# Patient Record
Sex: FEMALE | Race: WHITE | NOT HISPANIC OR LATINO | Employment: OTHER | URBAN - METROPOLITAN AREA
[De-identification: names, ages, dates, MRNs, and addresses within clinical notes are randomized per-mention and may not be internally consistent; named-entity substitution may affect disease eponyms.]

---

## 2017-04-28 ENCOUNTER — HOSPITAL ENCOUNTER (OUTPATIENT)
Dept: MAMMOGRAPHY | Facility: CLINIC | Age: 47
Discharge: HOME | End: 2017-04-28
Attending: INTERNAL MEDICINE | Admitting: INTERNAL MEDICINE
Payer: COMMERCIAL

## 2017-04-28 DIAGNOSIS — Z12.31 ENCOUNTER FOR SCREENING MAMMOGRAM FOR HIGH-RISK PATIENT: ICD-10-CM

## 2017-04-28 PROCEDURE — 77063 BREAST TOMOSYNTHESIS BI: CPT

## 2017-04-28 PROCEDURE — G0202 SCR MAMMO BI INCL CAD: HCPCS

## 2017-11-06 ENCOUNTER — HOSPITAL ENCOUNTER (OUTPATIENT)
Dept: MRI IMAGING | Facility: CLINIC | Age: 47
Discharge: HOME | End: 2017-11-06
Attending: INTERNAL MEDICINE | Admitting: INTERNAL MEDICINE
Payer: COMMERCIAL

## 2017-11-06 DIAGNOSIS — Z91.89 AT RISK FOR BREAST CANCER: ICD-10-CM

## 2017-11-06 DIAGNOSIS — R92.30 DENSE BREAST: ICD-10-CM

## 2017-11-06 PROCEDURE — 25000128 H RX IP 250 OP 636: Performed by: INTERNAL MEDICINE

## 2017-11-06 PROCEDURE — A9585 GADOBUTROL INJECTION: HCPCS | Performed by: INTERNAL MEDICINE

## 2017-11-06 PROCEDURE — 0159T MR BREAST BILATERAL W/O & W CONTRAST: CPT

## 2017-11-06 RX ORDER — GADOBUTROL 604.72 MG/ML
8 INJECTION INTRAVENOUS ONCE
Status: COMPLETED | OUTPATIENT
Start: 2017-11-06 | End: 2017-11-06

## 2017-11-06 RX ADMIN — GADOBUTROL 8 ML: 604.72 INJECTION INTRAVENOUS at 11:53

## 2017-11-07 ENCOUNTER — TELEPHONE (OUTPATIENT)
Dept: MAMMOGRAPHY | Facility: CLINIC | Age: 47
End: 2017-11-07

## 2017-11-07 NOTE — TELEPHONE ENCOUNTER
Ms. Evans was called and given her 11/6/2017 Breast MRI Results (Benign Findings). She will continue with her usual High risk Breast imaging routine per Dr. Torrez.

## 2018-05-01 ENCOUNTER — HOSPITAL ENCOUNTER (OUTPATIENT)
Dept: MAMMOGRAPHY | Facility: CLINIC | Age: 48
Discharge: HOME | End: 2018-05-01
Attending: INTERNAL MEDICINE | Admitting: INTERNAL MEDICINE
Payer: COMMERCIAL

## 2018-05-01 DIAGNOSIS — Z12.31 VISIT FOR SCREENING MAMMOGRAM: ICD-10-CM

## 2018-05-01 PROCEDURE — 77063 BREAST TOMOSYNTHESIS BI: CPT

## 2018-07-17 ENCOUNTER — TRANSFERRED RECORDS (OUTPATIENT)
Dept: HEALTH INFORMATION MANAGEMENT | Facility: CLINIC | Age: 48
End: 2018-07-17

## 2018-11-05 ENCOUNTER — HOSPITAL ENCOUNTER (OUTPATIENT)
Dept: MRI IMAGING | Facility: CLINIC | Age: 48
Discharge: HOME | End: 2018-11-05
Attending: INTERNAL MEDICINE | Admitting: INTERNAL MEDICINE
Payer: COMMERCIAL

## 2018-11-05 DIAGNOSIS — R92.30 DENSE BREAST: ICD-10-CM

## 2018-11-05 DIAGNOSIS — Z91.89 AT HIGH RISK FOR BREAST CANCER: ICD-10-CM

## 2018-11-05 PROCEDURE — 77059 MR BREAST BILATERAL W/O & W CONTRAST: CPT

## 2018-11-05 PROCEDURE — 25500064 ZZH RX 255 OP 636: Performed by: INTERNAL MEDICINE

## 2018-11-05 PROCEDURE — A9585 GADOBUTROL INJECTION: HCPCS | Performed by: INTERNAL MEDICINE

## 2018-11-05 RX ORDER — GADOBUTROL 604.72 MG/ML
8 INJECTION INTRAVENOUS ONCE
Status: COMPLETED | OUTPATIENT
Start: 2018-11-05 | End: 2018-11-05

## 2018-11-05 RX ADMIN — GADOBUTROL 8 ML: 604.72 INJECTION INTRAVENOUS at 10:58

## 2018-11-06 ENCOUNTER — TELEPHONE (OUTPATIENT)
Dept: MAMMOGRAPHY | Facility: CLINIC | Age: 48
End: 2018-11-06

## 2018-11-06 NOTE — TELEPHONE ENCOUNTER
After review by Breast Center Radiologist, Dr. Myron Mtz, Ms. Evans was called and given her 11/5/2018 Breast MRI results and recommended Follow up (see below).  I encouraged her to perform monthly breast self exams and to contact her doctor with any further breast concerns.    MRI BREASTS, BILATERAL, ENHANCED - 11/5/2018 11:06 AM     HISTORY: Family history of breast cancer. High risk screening.      COMPARISON: Prior breast MRI on November 6, 2017. Prior mammograms on  May 1, 2018.      TECHNIQUE: Both breasts were simultaneously evaluated using dedicated  breast coil.  Axial STIR, axial T1 before and at two-minute intervals  out to eight minutes following 10 ml Gadavist administration and  sagittal postcontrast images were performed, as well as subtraction,  CAD and angio mapping.     FINDINGS:      Right Breast: There is minimal  background parenchymal enhancement.     There are no areas of suspicious enhancement or lymphadenopathy.     Left Breast: There is minimal  background parenchymal enhancement.     There are no areas of suspicious enhancement or lymphadenopathy.         IMPRESSION:   BI-RADS 1, NEGATIVE. No MRI evidence of malignancy.      RECOMMENDED FOLLOW-UP:  Recommend routine annual screening mammography and breast MRI.      MYRON MTZ MD

## 2019-05-02 ENCOUNTER — HOSPITAL ENCOUNTER (OUTPATIENT)
Dept: MAMMOGRAPHY | Facility: CLINIC | Age: 49
Discharge: HOME | End: 2019-05-02
Attending: INTERNAL MEDICINE | Admitting: INTERNAL MEDICINE
Payer: COMMERCIAL

## 2019-05-02 DIAGNOSIS — Z12.31 VISIT FOR SCREENING MAMMOGRAM: ICD-10-CM

## 2019-05-02 PROCEDURE — 77063 BREAST TOMOSYNTHESIS BI: CPT

## 2019-08-28 NOTE — TELEPHONE ENCOUNTER
RECORDS STATUS - ALL OTHER DIAGNOSIS      RECORDS RECEIVED FROM: [facility]   DATE RECEIVED: 10/02/2019   NOTES STATUS DETAILS   OFFICE NOTE from referring provider YES EPIC   OFFICE NOTE from medical oncologist NA    DISCHARGE SUMMARY from hospital NA    DISCHARGE REPORT from the ER NA    OPERATIVE REPORT NA    MEDICATION LIST NA    CLINICAL TRIAL TREATMENTS TO DATE NA    LABS YES    PATHOLOGY REPORTS PENDING    ANYTHING RELATED TO DIAGNOSIS NA    GENONOMIC TESTING NA    TYPE:     IMAGING (NEED IMAGES & REPORT) YES    CT SCANS NA    MRI YES PACS  11/05/2018   MAMMO YES PACS  05/02/2019   ULTRASOUND YES PACS  04/25/2013   PET NA      Action BHUPENDRA   Action Taken REQUEST SENT TO [facility] 08/28/2019 [facility]

## 2019-09-24 NOTE — TELEPHONE ENCOUNTER
Action    Action Taken 9/24/2019 4:42pm     I left a vm for pt Gayla requesting a call back.   Ask Gayla if she's been to [facility] since 2018.

## 2019-09-28 NOTE — PROGRESS NOTES
[facility]    Hematology/Oncology Established Patient Follow-up Note      Today's Date: 10/02/19    Reason for Follow-up: High risk surveillance for breast cancer.    HISTORY OF PRESENT ILLNESS: Gayla Evans is a 49 year old female who presents with a strong family history of breast cancer but no established genetic mutation.  Mother was diagnosed with an ER negative/[place] negative for centimeter breast cancer at the age of 60 and maternal grandmother had breast cancer age 60s, maternal aunt diagnosed with breast cancer age 40s.  Her mother tested negative for BRCA 1/2 gene mutations.  Her maternal aunt never had any BRCA gene mutation testing but has been contemplating it. Gayla has no children.  Her maternal aunt also has no children.      INTERIM HISTORY:  Gayla Evans reports feeling well and denies any fevers, chills, night sweats, unintentional weight loss, bowel or bladder dysfunction.  She has no new breast complaints.  She has not had a menstrual period since January or February 2019.      REVIEW OF SYSTEMS:   14 point ROS was reviewed and is negative other than as noted above in HPI.       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    Gynecologic history: Age of menarche around 8 or 9 years old, G0, P0, no history of hormone replacement therapy.    PAST SURGICAL HISTORY:  Removal of polyps in the uterus in 2013.    SOCIAL HISTORY:  Social History     Socioeconomic History     Marital status:      Spouse name: Not on file     Number of children: Not on file     Years of education: Not on file     Highest  education level: Not on file   Occupational History     Not on file   Social Needs     Financial resource strain: Not on file     Food insecurity:     Worry: Not on file     Inability: Not on file     Transportation needs:     Medical: Not on file     Non-medical: Not on file   Tobacco Use     Smoking status: Former Smoker     Last attempt to quit: 2000     Years since quittin.7     Smokeless tobacco: Never Used   Substance and Sexual Activity     Alcohol use: Yes     Comment: 1-2 times per week, 2 drinks per time     Drug use: No     Sexual activity: Yes     Partners: Male   Lifestyle     Physical activity:     Days per week: Not on file     Minutes per session: Not on file     Stress: Not on file   Relationships     Social connections:     Talks on phone: Not on file     Gets together: Not on file     Attends Catholic service: Not on file     Active member of club or organization: Not on file     Attends meetings of clubs or organizations: Not on file     Relationship status: Not on file     Intimate partner violence:     Fear of current or ex partner: Not on file     Emotionally abused: Not on file     Physically abused: Not on file     Forced sexual activity: Not on file   Other Topics Concern     Parent/sibling w/ CABG, MI or angioplasty before 65F 55M? Not Asked   Social History Narrative     Not on file    and works as a healthcare executive.  Previously smoked casually in her teens to 20s but no longer smokes.  Has about 6-8 servings of wine or beer per week.      FAMILY HISTORY:  Family History   Problem Relation Age of Onset     Breast Cancer Mother      Hypertension Father      Breast Cancer Maternal Grandmother      Diabetes Maternal Grandfather      Other Cancer Paternal Grandmother         gall bladder cancer     Hypertension Paternal Grandfather      Breast Cancer Maternal Aunt      Breast Cancer Maternal Aunt    Mother had breast cancer diagnosed age 60, still living.  Maternal  "grandmother diagnosed with breast cancer age 60s.  Paternal grandmother diagnosed with bile duct cancer age 80s.  She has 1 brother and 1 sister with no medical issues.  She has 2 stepsons with no medical problems.  Maternal aunt was diagnosed with breast cancer age 40s and a second breast cancer age 60s, and another maternal aunt diagnosed with breast cancer age 60s with inflammatory type and passed away from her disease in late 2016.  Father had multiple myeloma.      PHYSICAL EXAM:  Vital signs:  /87   Pulse 59   Resp 16   Ht 1.702 m (5' 7\")   Wt 79.8 kg (176 lb)   SpO2 99%   BMI 27.57 kg/m     ECO  GENERAL/CONSTITUTIONAL: No acute distress.  EYES: Extraocular movements intact.  No scleral icterus.  LYMPH: No anterior cervical, posterior cervical, supraclavicular, axillary  adenopathy.   BREAST: Right with no palpable mass, ulceration, or rash.  Left with no palpable mass, ulceration or rash.  Nipples are everted bilaterally with no discharge.  GASTROINTESTINAL: No hepatosplenomegaly, masses, or tenderness. No guarding.  No distention.  MUSCULOSKELETAL: Warm and well-perfused, no cyanosis, clubbing, or edema.  INTEGUMENTARY: No rashes or jaundice.  GAIT: Steady, does not use assistive device      LABS:  None.      PATHOLOGY:  None.    IMAGING:  None new.    ASSESSMENT/PLAN:  Gayla Evans is a 49 year old female with the following issues:  1.  High risk for breast cancer due to strong family history of breast cancer  2.  Dense breasts, limiting sensitivity of mammogram detection  -I discussed with Gayla that she has no clinical evidence for breast abnormalities by physical exam.  -Given her breast density and strong family history of breast cancer, I recommended continuing alternating mammograms with breast MRI.  Next mammogram due May 2020.  Next breast MRI due 2019.  She will call to schedule her breast MRI.    3.  History of melanoma of left calf and basal cell skin " carcinoma  -Continue routine skin checks with dermatology.    Return in 1 year.      Jennifer Torrez MD  Hematology/Oncology  [facility]

## 2019-10-02 ENCOUNTER — PRE VISIT (OUTPATIENT)
Dept: ONCOLOGY | Facility: CLINIC | Age: 49
End: 2019-10-02

## 2019-10-02 ENCOUNTER — ONCOLOGY VISIT (OUTPATIENT)
Dept: ONCOLOGY | Facility: CLINIC | Age: 49
End: 2019-10-02
Attending: INTERNAL MEDICINE
Payer: COMMERCIAL

## 2019-10-02 VITALS
OXYGEN SATURATION: 99 % | BODY MASS INDEX: 27.62 KG/M2 | HEIGHT: 67 IN | WEIGHT: 176 LBS | DIASTOLIC BLOOD PRESSURE: 87 MMHG | SYSTOLIC BLOOD PRESSURE: 123 MMHG | RESPIRATION RATE: 16 BRPM | HEART RATE: 59 BPM

## 2019-10-02 DIAGNOSIS — Z91.89 AT HIGH RISK FOR BREAST CANCER: Primary | ICD-10-CM

## 2019-10-02 DIAGNOSIS — R92.30 DENSE BREAST TISSUE: ICD-10-CM

## 2019-10-02 DIAGNOSIS — Z80.3 FAMILY HISTORY OF MALIGNANT NEOPLASM OF BREAST: ICD-10-CM

## 2019-10-02 PROCEDURE — 99213 OFFICE O/P EST LOW 20 MIN: CPT | Performed by: INTERNAL MEDICINE

## 2019-10-02 PROCEDURE — G0463 HOSPITAL OUTPT CLINIC VISIT: HCPCS

## 2019-10-02 ASSESSMENT — PAIN SCALES - GENERAL: PAINLEVEL: NO PAIN (0)

## 2019-10-02 ASSESSMENT — MIFFLIN-ST. JEOR: SCORE: 1445.96

## 2019-10-02 NOTE — LETTER
10/2/2019         RE: Gayla Evans  [address] [place] [address]        Dear Colleague,    Thank you for referring your patient, Gayla Evans, to the [facility]. Please see a copy of my visit note below.    [facility]    Hematology/Oncology Established Patient Follow-up Note      Today's Date: 10/02/19    Reason for Follow-up: High risk surveillance for breast cancer.    HISTORY OF PRESENT ILLNESS: Gayla Evans is a 49 year old female who presents with a strong family history of breast cancer but no established genetic mutation.  Mother was diagnosed with an ER negative/[place] negative for centimeter breast cancer at the age of 60 and maternal grandmother had breast cancer age 60s, maternal aunt diagnosed with breast cancer age 40s.  Her mother tested negative for BRCA 1/2 gene mutations.  Her maternal aunt never had any BRCA gene mutation testing but has been contemplating it. Gayla has no children.  Her maternal aunt also has no children.      INTERIM HISTORY:  Gayla Evans reports feeling well and denies any fevers, chills, night sweats, unintentional weight loss, bowel or bladder dysfunction.  She has no new breast complaints.  She has not had a menstrual period since January or February 2019.      REVIEW OF SYSTEMS:   14 point ROS was reviewed and is negative other than as noted above in HPI.       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    Gynecologic history: Age of menarche around 8 or 9 years old, G0, P0, no history of hormone replacement therapy.    PAST SURGICAL  HISTORY:  Removal of polyps in the uterus in 2013.    SOCIAL HISTORY:  Social History     Socioeconomic History     Marital status:      Spouse name: Not on file     Number of children: Not on file     Years of education: Not on file     Highest education level: Not on file   Occupational History     Not on file   Social Needs     Financial resource strain: Not on file     Food insecurity:     Worry: Not on file     Inability: Not on file     Transportation needs:     Medical: Not on file     Non-medical: Not on file   Tobacco Use     Smoking status: Former Smoker     Last attempt to quit: 2000     Years since quittin.7     Smokeless tobacco: Never Used   Substance and Sexual Activity     Alcohol use: Yes     Comment: 1-2 times per week, 2 drinks per time     Drug use: No     Sexual activity: Yes     Partners: Male   Lifestyle     Physical activity:     Days per week: Not on file     Minutes per session: Not on file     Stress: Not on file   Relationships     Social connections:     Talks on phone: Not on file     Gets together: Not on file     Attends Restoration service: Not on file     Active member of club or organization: Not on file     Attends meetings of clubs or organizations: Not on file     Relationship status: Not on file     Intimate partner violence:     Fear of current or ex partner: Not on file     Emotionally abused: Not on file     Physically abused: Not on file     Forced sexual activity: Not on file   Other Topics Concern     Parent/sibling w/ CABG, MI or angioplasty before 65F 55M? Not Asked   Social History Narrative     Not on file    and works as a healthcare executive.  Previously smoked casually in her teens to 20s but no longer smokes.  Has about 6-8 servings of wine or beer per week.      FAMILY HISTORY:  Family History   Problem Relation Age of Onset     Breast Cancer Mother      Hypertension Father      Breast Cancer Maternal Grandmother      Diabetes Maternal  "Grandfather      Other Cancer Paternal Grandmother         gall bladder cancer     Hypertension Paternal Grandfather      Breast Cancer Maternal Aunt      Breast Cancer Maternal Aunt    Mother had breast cancer diagnosed age 60, still living.  Maternal grandmother diagnosed with breast cancer age 60s.  Paternal grandmother diagnosed with bile duct cancer age 80s.  She has 1 brother and 1 sister with no medical issues.  She has 2 stepsons with no medical problems.  Maternal aunt was diagnosed with breast cancer age 40s and a second breast cancer age 60s, and another maternal aunt diagnosed with breast cancer age 60s with inflammatory type and passed away from her disease in late 2016.  Father had multiple myeloma.      PHYSICAL EXAM:  Vital signs:  /87   Pulse 59   Resp 16   Ht 1.702 m (5' 7\")   Wt 79.8 kg (176 lb)   SpO2 99%   BMI 27.57 kg/m      ECO  GENERAL/CONSTITUTIONAL: No acute distress.  EYES: Extraocular movements intact.  No scleral icterus.  LYMPH: No anterior cervical, posterior cervical, supraclavicular, axillary  adenopathy.   BREAST: Right with no palpable mass, ulceration, or rash.  Left with no palpable mass, ulceration or rash.  Nipples are everted bilaterally with no discharge.  GASTROINTESTINAL: No hepatosplenomegaly, masses, or tenderness. No guarding.  No distention.  MUSCULOSKELETAL: Warm and well-perfused, no cyanosis, clubbing, or edema.  INTEGUMENTARY: No rashes or jaundice.  GAIT: Steady, does not use assistive device      LABS:  None.      PATHOLOGY:  None.    IMAGING:  None new.    ASSESSMENT/PLAN:  Gayla Evans is a 49 year old female with the following issues:  1.  High risk for breast cancer due to strong family history of breast cancer  2.  Dense breasts, limiting sensitivity of mammogram detection  -I discussed with Gayla that she has no clinical evidence for breast abnormalities by physical exam.  -Given her breast density and strong family history of breast cancer, " "I recommended continuing alternating mammograms with breast MRI.  Next mammogram due May 2020.  Next breast MRI due November 2019.  She will call to schedule her breast MRI.    3.  History of melanoma of left calf and basal cell skin carcinoma  -Continue routine skin checks with dermatology.    Return in 1 year.      Jennifer Torrez MD  Hematology/Oncology  [facility]      Oncology Rooming Note    October 2, 2019 10:12 AM   Gayla Evans is a 49 year old female who presents for:    Chief Complaint   Patient presents with     Oncology Clinic Visit     Initial Vitals: /87   Pulse 59   Resp 16   Ht 1.702 m (5' 7\")   Wt 79.8 kg (176 lb)   SpO2 99%   BMI 27.57 kg/m    Estimated body mass index is 27.57 kg/m  as calculated from the following:    Height as of this encounter: 1.702 m (5' 7\").    Weight as of this encounter: 79.8 kg (176 lb). Body surface area is 1.94 meters squared.  No Pain (0) Comment: Data Unavailable   No LMP recorded.  Allergies reviewed: Yes  Medications reviewed: Yes    Medications: Medication refills not needed today.  Pharmacy name entered into [employer]: [id number] - [place], [place] - [address]    Clinical concerns: no      Jyoti Tanner, [facility]              Again, thank you for allowing me to participate in the care of your patient.        Sincerely,        Jennifer Torrez MD    "

## 2019-10-02 NOTE — PROGRESS NOTES
"Oncology Rooming Note    October 2, 2019 10:12 AM   Gayla Evans is a 49 year old female who presents for:    Chief Complaint   Patient presents with     Oncology Clinic Visit     Initial Vitals: /87   Pulse 59   Resp 16   Ht 1.702 m (5' 7\")   Wt 79.8 kg (176 lb)   SpO2 99%   BMI 27.57 kg/m   Estimated body mass index is 27.57 kg/m  as calculated from the following:    Height as of this encounter: 1.702 m (5' 7\").    Weight as of this encounter: 79.8 kg (176 lb). Body surface area is 1.94 meters squared.  No Pain (0) Comment: Data Unavailable   No LMP recorded.  Allergies reviewed: Yes  Medications reviewed: Yes    Medications: Medication refills not needed today.  Pharmacy name entered into [email or web address]: [id number] - [place], [place] - [address]    Clinical concerns: no      Jyoti Tanner CMA            "

## 2019-10-02 NOTE — PATIENT INSTRUCTIONS
Patient will call and schedule her own mammogram appt.     Please call patient closer to appt date to schedule with Dr. Torrez.

## 2019-10-03 ENCOUNTER — HOSPITAL ENCOUNTER (OUTPATIENT)
Facility: CLINIC | Age: 49
End: 2019-10-03
Attending: COLON & RECTAL SURGERY | Admitting: COLON & RECTAL SURGERY
Payer: COMMERCIAL

## 2019-11-22 ENCOUNTER — TELEPHONE (OUTPATIENT)
Dept: MAMMOGRAPHY | Facility: CLINIC | Age: 49
End: 2019-11-22

## 2019-11-22 ENCOUNTER — HOSPITAL ENCOUNTER (OUTPATIENT)
Dept: MRI IMAGING | Facility: CLINIC | Age: 49
Discharge: HOME | End: 2019-11-22
Attending: INTERNAL MEDICINE | Admitting: INTERNAL MEDICINE
Payer: COMMERCIAL

## 2019-11-22 DIAGNOSIS — Z91.89 AT HIGH RISK FOR BREAST CANCER: ICD-10-CM

## 2019-11-22 DIAGNOSIS — Z80.3 FAMILY HISTORY OF MALIGNANT NEOPLASM OF BREAST: ICD-10-CM

## 2019-11-22 PROCEDURE — A9585 GADOBUTROL INJECTION: HCPCS | Performed by: INTERNAL MEDICINE

## 2019-11-22 PROCEDURE — 77049 MRI BREAST C-+ W/CAD BI: CPT

## 2019-11-22 PROCEDURE — 25500064 ZZH RX 255 OP 636: Performed by: INTERNAL MEDICINE

## 2019-11-22 RX ORDER — GADOBUTROL 604.72 MG/ML
8 INJECTION INTRAVENOUS ONCE
Status: COMPLETED | OUTPATIENT
Start: 2019-11-22 | End: 2019-11-22

## 2019-11-22 RX ADMIN — GADOBUTROL 8 ML: 604.72 INJECTION INTRAVENOUS at 11:41

## 2019-11-22 NOTE — TELEPHONE ENCOUNTER
Ms. Evans was called and given her 11/22/2019 Breast MRI Results:    Findings: No concerning areas of enhancement in either breast.      No lymphadenopathy.                                                                      Impression: BI-RADS CATEGORY: 1 -  NEGATIVE.     Recommendation: Annual breast cancer screening.     CONRAD ALAS MD      She will continue with her usual High Risk Breast imaging routine per Dr. Torrez.    Amanda HIGGINSN, RN  Procedure Nurse  [facility]  [phone]

## 2020-07-28 ENCOUNTER — ANCILLARY PROCEDURE (OUTPATIENT)
Dept: MAMMOGRAPHY | Facility: CLINIC | Age: 50
End: 2020-07-28
Attending: INTERNAL MEDICINE
Payer: COMMERCIAL

## 2020-07-28 DIAGNOSIS — Z80.3 FAMILY HISTORY OF MALIGNANT NEOPLASM OF BREAST: ICD-10-CM

## 2020-07-28 DIAGNOSIS — Z91.89 AT HIGH RISK FOR BREAST CANCER: ICD-10-CM

## 2020-07-28 PROCEDURE — 77067 SCR MAMMO BI INCL CAD: CPT | Mod: TC

## 2020-07-28 PROCEDURE — 77063 BREAST TOMOSYNTHESIS BI: CPT | Mod: TC

## 2020-08-26 ENCOUNTER — TELEPHONE (OUTPATIENT)
Dept: ONCOLOGY | Facility: CLINIC | Age: 50
End: 2020-08-26

## 2020-08-26 NOTE — TELEPHONE ENCOUNTER
Gayla Evans called clinic to schedule follow up with Dr. Torrez. She wants in person visit for Breast exam. She also stated that she got a massage yesterday and has some lumps on her back that she wants Dr. Torrez to look at. Inquired if she wanted to see VIRGEN Davis for lumps on her back and she would rather see Dr. Torrez. Her next available is 10/19/20 for in person. Also suggested she could see her primary care physician to have it evaluated. She will be scheduled 10/19/20 for an in person visit with Dr. Torrez at 0840 AM. Vika Fernandez RN,BSN,OCN

## 2020-10-30 NOTE — PROGRESS NOTES
[facility]    Hematology/Oncology Established Patient Follow-up Note      Today's Date: 11/02/20    Reason for Follow-up: High risk surveillance for breast cancer.    HISTORY OF PRESENT ILLNESS: Gayla Evans is a 50 year old female who presents with a strong family history of breast cancer but no established genetic mutation.  Mother was diagnosed with an ER negative/NV negative for centimeter breast cancer at the age of 60 and maternal grandmother had breast cancer age 60s, maternal aunt diagnosed with breast cancer age 40s.  Her mother tested negative for BRCA 1/2 gene mutations.  Her maternal aunt never had any BRCA gene mutation testing but has been contemplating it. Gayla has no children.  Her maternal aunt also has no children.      INTERIM HISTORY:  Gayla reports feeling well and has no specific complaints today.  She denies any breast pain or nipple discharge.      REVIEW OF SYSTEMS:   14 point ROS was reviewed and is negative other than as noted above in HPI.       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    Gynecologic history: Age of menarche around 8 or 9 years old, G0, P0, no history of hormone replacement therapy.    PAST SURGICAL HISTORY:  Removal of polyps in the uterus in 2013.    SOCIAL HISTORY:  Social History     Socioeconomic History     Marital status:      Spouse name: Not on file     Number of children: Not on file     Years of education: Not on file     Highest education level: Not on file   Occupational History     Not on file   Social Needs      Financial resource strain: Not on file     Food insecurity     Worry: Not on file     Inability: Not on file     Transportation needs     Medical: Not on file     Non-medical: Not on file   Tobacco Use     Smoking status: Former Smoker     Quit date: 2000     Years since quittin.8     Smokeless tobacco: Never Used   Substance and Sexual Activity     Alcohol use: Yes     Comment: 1-2 times per week, 2 drinks per time     Drug use: No     Sexual activity: Yes     Partners: Male   Lifestyle     Physical activity     Days per week: Not on file     Minutes per session: Not on file     Stress: Not on file   Relationships     Social connections     Talks on phone: Not on file     Gets together: Not on file     Attends Congregation service: Not on file     Active member of club or organization: Not on file     Attends meetings of clubs or organizations: Not on file     Relationship status: Not on file     Intimate partner violence     Fear of current or ex partner: Not on file     Emotionally abused: Not on file     Physically abused: Not on file     Forced sexual activity: Not on file   Other Topics Concern     Parent/sibling w/ CABG, MI or angioplasty before 65F 55M? Not Asked   Social History Narrative     Not on file    and works as a healthcare executive.  Previously smoked casually in her teens to 20s but no longer smokes.  Has about 6-8 servings of wine or beer per week.      FAMILY HISTORY:  Family History   Problem Relation Age of Onset     Breast Cancer Mother      Hypertension Father      Breast Cancer Maternal Grandmother      Diabetes Maternal Grandfather      Other Cancer Paternal Grandmother         gall bladder cancer     Hypertension Paternal Grandfather      Breast Cancer Maternal Aunt      Breast Cancer Maternal Aunt    Mother had breast cancer diagnosed age 60, still living.  Maternal grandmother diagnosed with breast cancer age 60s.  Paternal grandmother diagnosed with bile duct cancer age  "80s.  She has 1 brother and 1 sister with no medical issues.  She has 2 stepsons with no medical problems.  Maternal aunt was diagnosed with breast cancer age 40s and a second breast cancer age 60s, and another maternal aunt diagnosed with breast cancer age 60s with inflammatory type and passed away from her disease in late 2016.  Father  from multiple myeloma in .      PHYSICAL EXAM:  Vital signs:  /89   Pulse 61   Temp 98.5  F (36.9  C) (Oral)   Resp 18   Ht 1.702 m (5' 7\")   Wt 79.8 kg (176 lb)   SpO2 98%   BMI 27.57 kg/m     ECO  GENERAL/CONSTITUTIONAL: No acute distress.  EYES:  No scleral icterus.  LYMPH: No cervical, supraclavicular, axillary adenopathy.   BREAST: Right with no palpable mass, ulceration, or rash.  Left with no palpable mass, ulceration or rash.  Nipples are everted bilaterally with no discharge.  PULMONARY: No audible cough or wheezing. No labored breathing.  GASTROINTESTINAL: No hepatosplenomegaly, masses, or tenderness. No guarding.  No distention.  MUSCULOSKELETAL: Warm and well-perfused, no cyanosis, clubbing, or edema.  INTEGUMENTARY: No rashes or jaundice.  GAIT: Steady, does not use assistive device      LABS:  None.      PATHOLOGY:  None.    IMAGIN2020 Mammogram showed no suspicious findings.    ASSESSMENT/PLAN:  Gayla Evans is a 49 year old female with the following issues:  1.  High risk for breast cancer due to strong family history of breast cancer  2.  Dense breasts, limiting sensitivity of mammogram detection  -I discussed with Gayla that she has no clinical evidence for breast abnormalities by physical exam.  -Given her breast density and strong family history of breast cancer, I recommended continuing alternating mammograms with breast MRI.  Recommended breast MRI in 2021 but she will be away, wintering in [place] from January through March.  Therefore, we will push her breast MRI to 2021.  She will call to schedule her breast " MRI.  We will also schedule a mammogram 6 months after the next breast MRI, approximately October 2021.  3.  History of melanoma of left calf and basal cell skin carcinoma  -Continue routine skin checks with dermatology.    Return in 1 year.    Jennifer Torrez MD  Hematology/Oncology  [facility]    I spent a total of 15 minutes with the patient, with greater than 50% of the time in counseling and coordination of care.

## 2020-11-02 ENCOUNTER — ONCOLOGY VISIT (OUTPATIENT)
Dept: ONCOLOGY | Facility: CLINIC | Age: 50
End: 2020-11-02
Attending: INTERNAL MEDICINE
Payer: COMMERCIAL

## 2020-11-02 VITALS
TEMPERATURE: 98.5 F | RESPIRATION RATE: 18 BRPM | SYSTOLIC BLOOD PRESSURE: 124 MMHG | HEART RATE: 61 BPM | OXYGEN SATURATION: 98 % | WEIGHT: 176 LBS | DIASTOLIC BLOOD PRESSURE: 89 MMHG | BODY MASS INDEX: 27.62 KG/M2 | HEIGHT: 67 IN

## 2020-11-02 DIAGNOSIS — Z80.3 FAMILY HISTORY OF MALIGNANT NEOPLASM OF BREAST: ICD-10-CM

## 2020-11-02 DIAGNOSIS — Z91.89 AT HIGH RISK FOR BREAST CANCER: Primary | ICD-10-CM

## 2020-11-02 DIAGNOSIS — R92.30 DENSE BREAST TISSUE: ICD-10-CM

## 2020-11-02 PROCEDURE — 99214 OFFICE O/P EST MOD 30 MIN: CPT | Performed by: INTERNAL MEDICINE

## 2020-11-02 PROCEDURE — G0463 HOSPITAL OUTPT CLINIC VISIT: HCPCS

## 2020-11-02 RX ORDER — VALACYCLOVIR HYDROCHLORIDE 1 G/1
TABLET, FILM COATED ORAL
COMMUNITY
Start: 2020-06-11 | End: 2021-12-08

## 2020-11-02 ASSESSMENT — PAIN SCALES - GENERAL: PAINLEVEL: NO PAIN (0)

## 2020-11-02 ASSESSMENT — MIFFLIN-ST. JEOR: SCORE: 1440.96

## 2020-11-02 NOTE — PATIENT INSTRUCTIONS
Arrange for breast MRI in 4/2021.  Arrange for mammogram in 1 year.  RTC MD in 1 year.    Please call to schedule.

## 2020-11-02 NOTE — LETTER
11/2/2020         RE: Gayla Evans  [address] [place] [address]        Dear Colleague,    Thank you for referring your patient, Gayla Evans, to the [facility]. Please see a copy of my visit note below.    [facility]    Hematology/Oncology Established Patient Follow-up Note      Today's Date: 11/02/20    Reason for Follow-up: High risk surveillance for breast cancer.    HISTORY OF PRESENT ILLNESS: Gayla Evans is a 50 year old female who presents with a strong family history of breast cancer but no established genetic mutation.  Mother was diagnosed with an ER negative/NV negative for centimeter breast cancer at the age of 60 and maternal grandmother had breast cancer age 60s, maternal aunt diagnosed with breast cancer age 40s.  Her mother tested negative for BRCA 1/2 gene mutations.  Her maternal aunt never had any BRCA gene mutation testing but has been contemplating it. Gayla has no children.  Her maternal aunt also has no children.      INTERIM HISTORY:  Gayla reports feeling well and has no specific complaints today.  She denies any breast pain or nipple discharge.      REVIEW OF SYSTEMS:   14 point ROS was reviewed and is negative other than as noted above in HPI.       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    Gynecologic history: Age of menarche around 8 or 9 years old, G0, P0, no history of hormone replacement therapy.    PAST SURGICAL HISTORY:  Removal of polyps in the uterus in 2013.    SOCIAL HISTORY:  Social History      Socioeconomic History     Marital status:      Spouse name: Not on file     Number of children: Not on file     Years of education: Not on file     Highest education level: Not on file   Occupational History     Not on file   Social Needs     Financial resource strain: Not on file     Food insecurity     Worry: Not on file     Inability: Not on file     Transportation needs     Medical: Not on file     Non-medical: Not on file   Tobacco Use     Smoking status: Former Smoker     Quit date: 2000     Years since quittin.8     Smokeless tobacco: Never Used   Substance and Sexual Activity     Alcohol use: Yes     Comment: 1-2 times per week, 2 drinks per time     Drug use: No     Sexual activity: Yes     Partners: Male   Lifestyle     Physical activity     Days per week: Not on file     Minutes per session: Not on file     Stress: Not on file   Relationships     Social connections     Talks on phone: Not on file     Gets together: Not on file     Attends Rastafarian service: Not on file     Active member of club or organization: Not on file     Attends meetings of clubs or organizations: Not on file     Relationship status: Not on file     Intimate partner violence     Fear of current or ex partner: Not on file     Emotionally abused: Not on file     Physically abused: Not on file     Forced sexual activity: Not on file   Other Topics Concern     Parent/sibling w/ CABG, MI or angioplasty before 65F 55M? Not Asked   Social History Narrative     Not on file    and works as a healthcare executive.  Previously smoked casually in her teens to 20s but no longer smokes.  Has about 6-8 servings of wine or beer per week.      FAMILY HISTORY:  Family History   Problem Relation Age of Onset     Breast Cancer Mother      Hypertension Father      Breast Cancer Maternal Grandmother      Diabetes Maternal Grandfather      Other Cancer Paternal Grandmother         gall bladder cancer     Hypertension Paternal  "Grandfather      Breast Cancer Maternal Aunt      Breast Cancer Maternal Aunt    Mother had breast cancer diagnosed age 60, still living.  Maternal grandmother diagnosed with breast cancer age 60s.  Paternal grandmother diagnosed with bile duct cancer age 80s.  She has 1 brother and 1 sister with no medical issues.  She has 2 stepsons with no medical problems.  Maternal aunt was diagnosed with breast cancer age 40s and a second breast cancer age 60s, and another maternal aunt diagnosed with breast cancer age 60s with inflammatory type and passed away from her disease in late 2016.  Father  from multiple myeloma in .      PHYSICAL EXAM:  Vital signs:  /89   Pulse 61   Temp 98.5  F (36.9  C) (Oral)   Resp 18   Ht 1.702 m (5' 7\")   Wt 79.8 kg (176 lb)   SpO2 98%   BMI 27.57 kg/m     ECO  GENERAL/CONSTITUTIONAL: No acute distress.  EYES:  No scleral icterus.  LYMPH: No cervical, supraclavicular, axillary adenopathy.   BREAST: Right with no palpable mass, ulceration, or rash.  Left with no palpable mass, ulceration or rash.  Nipples are everted bilaterally with no discharge.  PULMONARY: No audible cough or wheezing. No labored breathing.  GASTROINTESTINAL: No hepatosplenomegaly, masses, or tenderness. No guarding.  No distention.  MUSCULOSKELETAL: Warm and well-perfused, no cyanosis, clubbing, or edema.  INTEGUMENTARY: No rashes or jaundice.  GAIT: Steady, does not use assistive device      LABS:  None.      PATHOLOGY:  None.    IMAGIN2020 Mammogram showed no suspicious findings.    ASSESSMENT/PLAN:  Gayla Evans is a 49 year old female with the following issues:  1.  High risk for breast cancer due to strong family history of breast cancer  2.  Dense breasts, limiting sensitivity of mammogram detection  -I discussed with Gayla that she has no clinical evidence for breast abnormalities by physical exam.  -Given her breast density and strong family history of breast cancer, I recommended " "continuing alternating mammograms with breast MRI.  Recommended breast MRI in January 2021 but she will be away, wintering in [place] from January through March.  Therefore, we will push her breast MRI to April 2021.  She will call to schedule her breast MRI.  We will also schedule a mammogram 6 months after the next breast MRI, approximately October 2021.  3.  History of melanoma of left calf and basal cell skin carcinoma  -Continue routine skin checks with dermatology.    Return in 1 year.    Jennifer Torrez MD  Hematology/Oncology  [facility]    I spent a total of 15 minutes with the patient, with greater than 50% of the time in counseling and coordination of care.    Oncology Rooming Note    November 2, 2020 9:13 AM   Gayla Evans is a 50 year old female who presents for:    Chief Complaint   Patient presents with     Oncology Clinic Visit     Initial Vitals: There were no vitals taken for this visit. Estimated body mass index is 27.57 kg/m  as calculated from the following:    Height as of 10/2/19: 1.702 m (5' 7\").    Weight as of 10/2/19: 79.8 kg (176 lb). There is no height or weight on file to calculate BSA.  Data Unavailable Comment: Data Unavailable   No LMP recorded.    Allergies reviewed: Yes  Medications reviewed: Yes    Medications: Medication refills not needed today.  Pharmacy name entered into [employer]: [id number] - [place], [place] - [address]    Clinical concerns:    Dr. Torrez was notified.      Nat Fleming, AMANDA  11/2/2020      9:13 AM      Again, thank you for allowing me to participate in the care of your patient.        Sincerely,        Jennifer Torrez MD    "

## 2020-11-02 NOTE — PROGRESS NOTES
"Oncology Rooming Note    November 2, 2020 9:13 AM   Gayla Evans is a 50 year old female who presents for:    Chief Complaint   Patient presents with     Oncology Clinic Visit     Initial Vitals: There were no vitals taken for this visit. Estimated body mass index is 27.57 kg/m  as calculated from the following:    Height as of 10/2/19: 1.702 m (5' 7\").    Weight as of 10/2/19: 79.8 kg (176 lb). There is no height or weight on file to calculate BSA.  Data Unavailable Comment: Data Unavailable   No LMP recorded.    Allergies reviewed: Yes  Medications reviewed: Yes    Medications: Medication refills not needed today.  Pharmacy name entered into [employer]: [id number] - [place], [place] - [address]    Clinical concerns:    Dr. Torrez was notified.      Nat Fleming, AMANDA  11/2/2020      9:13 AM  "

## 2021-01-20 ENCOUNTER — TELEPHONE (OUTPATIENT)
Dept: ONCOLOGY | Facility: CLINIC | Age: 51
End: 2021-01-20

## 2021-01-20 NOTE — CONFIDENTIAL NOTE
Message left for patient to schedule return appointments as per Dr Torrez discharge instructions from 11-02-20:    Arrange for breast MRI in 4/2021.  Arrange for mammogram in 1 year.  RTC MD in 1 year.

## 2021-04-16 ENCOUNTER — HOSPITAL ENCOUNTER (OUTPATIENT)
Dept: MRI IMAGING | Facility: CLINIC | Age: 51
Discharge: HOME | End: 2021-04-16
Attending: INTERNAL MEDICINE | Admitting: INTERNAL MEDICINE
Payer: COMMERCIAL

## 2021-04-16 DIAGNOSIS — Z80.3 FAMILY HISTORY OF MALIGNANT NEOPLASM OF BREAST: ICD-10-CM

## 2021-04-16 DIAGNOSIS — Z91.89 AT HIGH RISK FOR BREAST CANCER: ICD-10-CM

## 2021-04-16 PROCEDURE — 77049 MRI BREAST C-+ W/CAD BI: CPT

## 2021-04-16 PROCEDURE — 255N000002 HC RX 255 OP 636: Performed by: INTERNAL MEDICINE

## 2021-04-16 PROCEDURE — A9585 GADOBUTROL INJECTION: HCPCS | Performed by: INTERNAL MEDICINE

## 2021-04-16 RX ORDER — GADOBUTROL 604.72 MG/ML
10 INJECTION INTRAVENOUS ONCE
Status: COMPLETED | OUTPATIENT
Start: 2021-04-16 | End: 2021-04-16

## 2021-04-16 RX ADMIN — GADOBUTROL 8 ML: 604.72 INJECTION INTRAVENOUS at 08:15

## 2021-04-21 ENCOUNTER — TELEPHONE (OUTPATIENT)
Dept: ONCOLOGY | Facility: CLINIC | Age: 51
End: 2021-04-21

## 2021-04-21 NOTE — TELEPHONE ENCOUNTER
Gayla called requesting her Breast MRI results. She was informed that they were benign. Gayla has 3 separate MR accounts. Her correct MR # is [id number]. Called medical records to inform them that her MR number needs to be merged and remove the one with the wrong birthdate and one with the inactivity. Vika Fernandez RN,BSN,OCN

## 2021-06-04 NOTE — PROGRESS NOTES
[facility]    Hematology/Oncology Established Patient Follow-up Note    Today's Date: 6/9/2021    Reason for Follow-up: High risk surveillance for breast cancer.    Video visit duration: 17 minutes    HISTORY OF PRESENT ILLNESS: Gayla Evans is a 50 year old female who presents with a strong family history of breast cancer but no established genetic mutation.  Mother was diagnosed with an ER negative/[place] negative for centimeter breast cancer at the age of 60 and maternal grandmother had breast cancer age 60s, maternal aunt diagnosed with breast cancer age 40s.  Her mother tested negative for BRCA 1/2 gene mutations.  Her maternal aunt never had any BRCA gene mutation testing but has been contemplating it. Gayla has no children.  Her maternal aunt also has no children.    INTERIM HISTORY:  Gayla is a couple years into menopause and she is experiencing menopausal effects, in particular, vaginal dryness despite lubrication, abdominal weight gain, hot flashes, decreased libido.  She also has questions about molecular breast imaging.    She denies any breast pain or nipple discharge.      REVIEW OF SYSTEMS:   14 point ROS was reviewed and is negative other than as noted above in HPI.       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    Gynecologic history: Age of menarche around 8 or 9 years old, G0, P0, no history of hormone replacement therapy.    PAST SURGICAL HISTORY:  Removal of polyps in the uterus in 2013.    SOCIAL  HISTORY:  Social History     Socioeconomic History     Marital status:      Spouse name: Not on file     Number of children: Not on file     Years of education: Not on file     Highest education level: Not on file   Occupational History     Not on file   Social Needs     Financial resource strain: Not on file     Food insecurity     Worry: Not on file     Inability: Not on file     Transportation needs     Medical: Not on file     Non-medical: Not on file   Tobacco Use     Smoking status: Former Smoker     Quit date: 2000     Years since quittin.4     Smokeless tobacco: Never Used   Substance and Sexual Activity     Alcohol use: Yes     Comment: 1-2 times per week, 2 drinks per time     Drug use: No     Sexual activity: Yes     Partners: Male   Lifestyle     Physical activity     Days per week: Not on file     Minutes per session: Not on file     Stress: Not on file   Relationships     Social connections     Talks on phone: Not on file     Gets together: Not on file     Attends Moravian service: Not on file     Active member of club or organization: Not on file     Attends meetings of clubs or organizations: Not on file     Relationship status: Not on file     Intimate partner violence     Fear of current or ex partner: Not on file     Emotionally abused: Not on file     Physically abused: Not on file     Forced sexual activity: Not on file   Other Topics Concern     Parent/sibling w/ CABG, MI or angioplasty before 65F 55M? Not Asked   Social History Narrative     Not on file    and works as a healthcare executive.  Previously smoked casually in her teens to 20s but no longer smokes.  Has about 6-8 servings of wine or beer per week.      FAMILY HISTORY:  Family History   Problem Relation Age of Onset     Breast Cancer Mother      Hypertension Father      Breast Cancer Maternal Grandmother      Diabetes Maternal Grandfather      Other Cancer Paternal Grandmother         gall bladder cancer      Hypertension Paternal Grandfather      Breast Cancer Maternal Aunt      Breast Cancer Maternal Aunt    Mother had breast cancer diagnosed age 60 that was ER negative, still living.  Maternal grandmother diagnosed with breast cancer age 60s.  Paternal grandmother diagnosed with bile duct cancer age 80s.  She has 1 brother and 1 sister with no medical issues.  She has 2 stepsons with no medical problems.  Maternal aunt was diagnosed with breast cancer age 40s and a second breast cancer age 60s, and another maternal aunt diagnosed with breast cancer age 60s with inflammatory type and passed away from her disease in late 2016.  Father  from multiple myeloma in .      PHYSICAL EXAM:  Vital signs:  There were no vitals taken for this visit.   GENERAL: No acute distress.  EYES: No scleral icterus. No overt erythema.  RESPIRATORY: No cough.  No labored breathing.  MUSCULOSKELETAL: Range of motion in the neck, shoulders, and arms appear normal.  SKIN: No overt rashes, discolorations, or lesions over the face and neck.  NEUROLOGIC: Alert.  No overt tremors.  PSYCHIATRIC: Normal affect and mood.  Does not appear anxious.    LABS:  None.    PATHOLOGY:  None.    IMAGIN2020 Mammogram showed no suspicious findings.    2021: Breast MRI showed no suspicious findings.    ASSESSMENT/PLAN:  Gayla Evans is a 50 year old female with the following issues:  1.  High risk for breast cancer due to strong family history of breast cancer  2.  Dense breasts, limiting sensitivity of mammogram detection  3. Decreased libido  4. Vaginal dryness  -I discussed with Gayla that she has no clinical evidence for breast abnormalities by clinical history or breast MRI reviewed from 2021.  -Given her breast density and strong family history of breast cancer, I recommended continuing alternating mammograms with breast MRI.  We will also schedule a mammogram 6 months from her recent breast MRI, approximately 2021.  -I  explained that molecular breast imaging does carry a relatively high amount of radiation exposure and that I would not recommend it at present time since she is able to tolerate alternating mammogram with breast MRI.  -I also recommended against use of daily topical testosterone use which could potentially increase her risk of developing breast cancer (as some breast cancers can be androgen-driven).  However, I told her it is okay for her to continue use of vaginal estrogen cream maintenance 1-2 times per week as the systemic absorption is very low/minimal.  -Discussed that she could ask her gynecologist if trying a supplement called Ristela (a plant-based, steroid-free, hormone-free pill) might help her libido in a safe manner.  5.  History of melanoma of left calf and basal cell skin carcinoma  -Continue routine skin checks with dermatology.    Return in 6 months.    Jennifer Torrez MD  Hematology/Oncology  [facility]    Total time spent: 25 minutes in patient evaluation, discussion of plan of care, and documentation.

## 2021-06-05 ENCOUNTER — RECORDS - HEALTHEAST (OUTPATIENT)
Dept: SCHEDULING | Facility: CLINIC | Age: 51
End: 2021-06-05

## 2021-06-05 ENCOUNTER — HEALTH MAINTENANCE LETTER (OUTPATIENT)
Age: 51
End: 2021-06-05

## 2021-06-05 DIAGNOSIS — R22.9 LOCALIZED SUPERFICIAL SWELLING, MASS, OR LUMP: ICD-10-CM

## 2021-06-09 ENCOUNTER — VIRTUAL VISIT (OUTPATIENT)
Dept: ONCOLOGY | Facility: CLINIC | Age: 51
End: 2021-06-09
Attending: INTERNAL MEDICINE
Payer: COMMERCIAL

## 2021-06-09 DIAGNOSIS — N95.1 VAGINAL DRYNESS, MENOPAUSAL: ICD-10-CM

## 2021-06-09 DIAGNOSIS — Z80.3 FAMILY HISTORY OF MALIGNANT NEOPLASM OF BREAST: ICD-10-CM

## 2021-06-09 DIAGNOSIS — R68.82 DECREASED LIBIDO: ICD-10-CM

## 2021-06-09 DIAGNOSIS — Z91.89 AT HIGH RISK FOR BREAST CANCER: Primary | ICD-10-CM

## 2021-06-09 DIAGNOSIS — R92.30 DENSE BREAST TISSUE: ICD-10-CM

## 2021-06-09 PROCEDURE — 99214 OFFICE O/P EST MOD 30 MIN: CPT | Mod: 95 | Performed by: INTERNAL MEDICINE

## 2021-06-09 NOTE — LETTER
6/9/2021         RE: Gayla Evans  [address] [place] [address]        Dear Colleague,    Thank you for referring your patient, Gayla Evans, to the [facility]CER [facility]. Please see a copy of my visit note below.    [facility]    Hematology/Oncology Established Patient Follow-up Note    Today's Date: 6/9/2021    Reason for Follow-up: High risk surveillance for breast cancer.    Video visit duration: 17 minutes    HISTORY OF PRESENT ILLNESS: Gayla Evans is a 50 year old female who presents with a strong family history of breast cancer but no established genetic mutation.  Mother was diagnosed with an ER negative/ND negative for centimeter breast cancer at the age of 60 and maternal grandmother had breast cancer age 60s, maternal aunt diagnosed with breast cancer age 40s.  Her mother tested negative for BRCA 1/2 gene mutations.  Her maternal aunt never had any BRCA gene mutation testing but has been contemplating it. Gayla has no children.  Her maternal aunt also has no children.    INTERIM HISTORY:  Gayla is a couple years into menopause and she is experiencing menopausal effects, in particular, vaginal dryness despite lubrication, abdominal weight gain, hot flashes, decreased libido.  She also has questions about molecular breast imaging.    She denies any breast pain or nipple discharge.      REVIEW OF SYSTEMS:   14 point ROS was reviewed and is negative other than as noted above in HPI.       HOME MEDICATIONS:  Current Outpatient Medications   Medication Sig Dispense Refill     multivitamin, therapeutic with minerals (MULTI-VITAMIN) TABS Take 1 tablet by mouth daily       valACYclovir (VALTREX) 1000 mg tablet TAKE 2 TS PO BID FOR 1 DAY       VITAMIN D, CHOLECALCIFEROL, PO Take by mouth daily           ALLERGIES:  No Known Allergies      PAST MEDICAL HISTORY:  Past Medical History:   Diagnosis Date     Cancer (H)      Skin cancer     basal cell   Stage IA left  calf malignant melanoma status post removal 10/09/2017.    Gynecologic history: Age of menarche around 8 or 9 years old, G0, P0, no history of hormone replacement therapy.    PAST SURGICAL HISTORY:  Removal of polyps in the uterus in .    SOCIAL HISTORY:  Social History     Socioeconomic History     Marital status:      Spouse name: Not on file     Number of children: Not on file     Years of education: Not on file     Highest education level: Not on file   Occupational History     Not on file   Social Needs     Financial resource strain: Not on file     Food insecurity     Worry: Not on file     Inability: Not on file     Transportation needs     Medical: Not on file     Non-medical: Not on file   Tobacco Use     Smoking status: Former Smoker     Quit date: 2000     Years since quittin.4     Smokeless tobacco: Never Used   Substance and Sexual Activity     Alcohol use: Yes     Comment: 1-2 times per week, 2 drinks per time     Drug use: No     Sexual activity: Yes     Partners: Male   Lifestyle     Physical activity     Days per week: Not on file     Minutes per session: Not on file     Stress: Not on file   Relationships     Social connections     Talks on phone: Not on file     Gets together: Not on file     Attends [employer] service: Not on file     Active member of club or organization: Not on file     Attends meetings of clubs or organizations: Not on file     Relationship status: Not on file     Intimate partner violence     Fear of current or ex partner: Not on file     Emotionally abused: Not on file     Physically abused: Not on file     Forced sexual activity: Not on file   Other Topics Concern     Parent/sibling w/ CABG, MI or angioplasty before 65F 55M? Not Asked   Social History Narrative     Not on file    and works as a healthcare executive.  Previously smoked casually in her teens to 20s but no longer smokes.  Has about 6-8 servings of wine or beer per week.      FAMILY  HISTORY:  Family History   Problem Relation Age of Onset     Breast Cancer Mother      Hypertension Father      Breast Cancer Maternal Grandmother      Diabetes Maternal Grandfather      Other Cancer Paternal Grandmother         gall bladder cancer     Hypertension Paternal Grandfather      Breast Cancer Maternal Aunt      Breast Cancer Maternal Aunt    Mother had breast cancer diagnosed age 60 that was ER negative, still living.  Maternal grandmother diagnosed with breast cancer age 60s.  Paternal grandmother diagnosed with bile duct cancer age 80s.  She has 1 brother and 1 sister with no medical issues.  She has 2 stepsons with no medical problems.  Maternal aunt was diagnosed with breast cancer age 40s and a second breast cancer age 60s, and another maternal aunt diagnosed with breast cancer age 60s with inflammatory type and passed away from her disease in late 2016.  Father  from multiple myeloma in .      PHYSICAL EXAM:  Vital signs:  There were no vitals taken for this visit.   GENERAL: No acute distress.  EYES: No scleral icterus. No overt erythema.  RESPIRATORY: No cough.  No labored breathing.  MUSCULOSKELETAL: Range of motion in the neck, shoulders, and arms appear normal.  SKIN: No overt rashes, discolorations, or lesions over the face and neck.  NEUROLOGIC: Alert.  No overt tremors.  PSYCHIATRIC: Normal affect and mood.  Does not appear anxious.    LABS:  None.    PATHOLOGY:  None.    IMAGIN2020 Mammogram showed no suspicious findings.    2021: Breast MRI showed no suspicious findings.    ASSESSMENT/PLAN:  Gayla Evans is a 50 year old female with the following issues:  1.  High risk for breast cancer due to strong family history of breast cancer  2.  Dense breasts, limiting sensitivity of mammogram detection  3. Decreased libido  4. Vaginal dryness  -I discussed with Gayla that she has no clinical evidence for breast abnormalities by clinical history or breast MRI reviewed from  4/16/2021.  -Given her breast density and strong family history of breast cancer, I recommended continuing alternating mammograms with breast MRI.  We will also schedule a mammogram 6 months from her recent breast MRI, approximately October 2021.  -I explained that molecular breast imaging does carry a relatively high amount of radiation exposure and that I would not recommend it at present time since she is able to tolerate alternating mammogram with breast MRI.  -I also recommended against use of daily topical testosterone use which could potentially increase her risk of developing breast cancer (as some breast cancers can be androgen-driven).  However, I told her it is okay for her to continue use of vaginal estrogen cream maintenance 1-2 times per week as the systemic absorption is very low/minimal.  -Discussed that she could ask her gynecologist if trying a supplement called Ristela (a plant-based, steroid-free, hormone-free pill) might help her libido in a safe manner.  5.  History of melanoma of left calf and basal cell skin carcinoma  -Continue routine skin checks with dermatology.    Return in 6 months.    Jennifer Torrez MD  Hematology/Oncology  [facility]    Total time spent: 25 minutes in patient evaluation, discussion of plan of care, and documentation.    Gayla is a 50 year old who is being evaluated via a billable video visit.      How would you like to obtain your AVS? MyChart  If the video visit is dropped, the invitation should be resent by: Text to cell phone: [phone]  Will anyone else be joining your video visit? No      Video Start Time:   Video-Visit Details    Type of service:  Video Visit    Video End Time:    Originating Location (pt. Location): Home    Distant Location (provider location):  [facility]     Platform used for Video Visit: Kristan      Again, thank you for allowing me to participate in the care of your patient.         Sincerely,        Jennifer Torrez MD

## 2021-06-09 NOTE — PROGRESS NOTES
Gayla is a 50 year old who is being evaluated via a billable video visit.      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 No      Video Start Time:   Video-Visit Details    Type of service:  Video Visit    Video End Time:    Originating Location (pt. Location): Home    Distant Location (provider location):  [facility]     Platform used for Video Visit: Dopios

## 2021-06-09 NOTE — LETTER
6/9/2021         RE: Gayla Evans  [address] [place] [address]        Dear Colleague,    Thank you for referring your patient, Gayla Evans, to the [facility] CANCER [facility]. Please see a copy of my visit note below.    [facility]    Hematology/Oncology Established Patient Follow-up Note    Today's Date: 6/9/2021    Reason for Follow-up: High risk surveillance for breast cancer.    Video visit duration: 17 minutes    HISTORY OF PRESENT ILLNESS: Gayla Evans is a 50 year old female who presents with a strong family history of breast cancer but no established genetic mutation.  Mother was diagnosed with an ER negative/[place] negative for centimeter breast cancer at the age of 60 and maternal grandmother had breast cancer age 60s, maternal aunt diagnosed with breast cancer age 40s.  Her mother tested negative for BRCA 1/2 gene mutations.  Her maternal aunt never had any BRCA gene mutation testing but has been contemplating it. Gayla has no children.  Her maternal aunt also has no children.    INTERIM HISTORY:  Gayla is a couple years into menopause and she is experiencing menopausal effects, in particular, vaginal dryness despite lubrication, abdominal weight gain, hot flashes, decreased libido.  She also has questions about molecular breast imaging.    She denies any breast pain or nipple discharge.      REVIEW OF SYSTEMS:   14 point ROS was reviewed and is negative other than as noted above in HPI.       HOME MEDICATIONS:  Current Outpatient Medications   Medication Sig Dispense Refill     multivitamin, therapeutic with minerals (MULTI-VITAMIN) TABS Take 1 tablet by mouth daily       valACYclovir (VALTREX) 1000 mg tablet TAKE 2 TS PO BID FOR 1 DAY       VITAMIN D, CHOLECALCIFEROL, PO Take by mouth daily           ALLERGIES:  No Known Allergies      PAST MEDICAL HISTORY:  Past Medical History:   Diagnosis Date     Cancer (H)      Skin cancer     basal cell   Stage IA left  calf malignant melanoma status post removal 10/09/2017.    Gynecologic history: Age of menarche around 8 or 9 years old, G0, P0, no history of hormone replacement therapy.    PAST SURGICAL HISTORY:  Removal of polyps in the uterus in .    SOCIAL HISTORY:  Social History     Socioeconomic History     Marital status:      Spouse name: Not on file     Number of children: Not on file     Years of education: Not on file     Highest education level: Not on file   Occupational History     Not on file   Social Needs     Financial resource strain: Not on file     Food insecurity     Worry: Not on file     Inability: Not on file     Transportation needs     Medical: Not on file     Non-medical: Not on file   Tobacco Use     Smoking status: Former Smoker     Quit date: 2000     Years since quittin.4     Smokeless tobacco: Never Used   Substance and Sexual Activity     Alcohol use: Yes     Comment: 1-2 times per week, 2 drinks per time     Drug use: No     Sexual activity: Yes     Partners: Male   Lifestyle     Physical activity     Days per week: Not on file     Minutes per session: Not on file     Stress: Not on file   Relationships     Social connections     Talks on phone: Not on file     Gets together: Not on file     Attends [employer] service: Not on file     Active member of club or organization: Not on file     Attends meetings of clubs or organizations: Not on file     Relationship status: Not on file     Intimate partner violence     Fear of current or ex partner: Not on file     Emotionally abused: Not on file     Physically abused: Not on file     Forced sexual activity: Not on file   Other Topics Concern     Parent/sibling w/ CABG, MI or angioplasty before 65F 55M? Not Asked   Social History Narrative     Not on file    and works as a healthcare executive.  Previously smoked casually in her teens to 20s but no longer smokes.  Has about 6-8 servings of wine or beer per week.      FAMILY  HISTORY:  Family History   Problem Relation Age of Onset     Breast Cancer Mother      Hypertension Father      Breast Cancer Maternal Grandmother      Diabetes Maternal Grandfather      Other Cancer Paternal Grandmother         gall bladder cancer     Hypertension Paternal Grandfather      Breast Cancer Maternal Aunt      Breast Cancer Maternal Aunt    Mother had breast cancer diagnosed age 60 that was ER negative, still living.  Maternal grandmother diagnosed with breast cancer age 60s.  Paternal grandmother diagnosed with bile duct cancer age 80s.  She has 1 brother and 1 sister with no medical issues.  She has 2 stepsons with no medical problems.  Maternal aunt was diagnosed with breast cancer age 40s and a second breast cancer age 60s, and another maternal aunt diagnosed with breast cancer age 60s with inflammatory type and passed away from her disease in late 2016.  Father  from multiple myeloma in .      PHYSICAL EXAM:  Vital signs:  There were no vitals taken for this visit.   GENERAL: No acute distress.  EYES: No scleral icterus. No overt erythema.  RESPIRATORY: No cough.  No labored breathing.  MUSCULOSKELETAL: Range of motion in the neck, shoulders, and arms appear normal.  SKIN: No overt rashes, discolorations, or lesions over the face and neck.  NEUROLOGIC: Alert.  No overt tremors.  PSYCHIATRIC: Normal affect and mood.  Does not appear anxious.    LABS:  None.    PATHOLOGY:  None.    IMAGIN2020 Mammogram showed no suspicious findings.    2021: Breast MRI showed no suspicious findings.    ASSESSMENT/PLAN:  Gayla Evans is a 50 year old female with the following issues:  1.  High risk for breast cancer due to strong family history of breast cancer  2.  Dense breasts, limiting sensitivity of mammogram detection  3. Decreased libido  4. Vaginal dryness  -I discussed with Gayla that she has no clinical evidence for breast abnormalities by clinical history or breast MRI reviewed from  4/16/2021.  -Given her breast density and strong family history of breast cancer, I recommended continuing alternating mammograms with breast MRI.  We will also schedule a mammogram 6 months from her recent breast MRI, approximately October 2021.  -I explained that molecular breast imaging does carry a relatively high amount of radiation exposure and that I would not recommend it at present time since she is able to tolerate alternating mammogram with breast MRI.  -I also recommended against use of daily topical testosterone use which could potentially increase her risk of developing breast cancer (as some breast cancers can be androgen-driven).  However, I told her it is okay for her to continue use of vaginal estrogen cream maintenance 1-2 times per week as the systemic absorption is very low/minimal.  -Discussed that she could ask her gynecologist if trying a supplement called Ristela (a plant-based, steroid-free, hormone-free pill) might help her libido in a safe manner.  5.  History of melanoma of left calf and basal cell skin carcinoma  -Continue routine skin checks with dermatology.    Return in 6 months.    Jennifer Torrez MD  Hematology/Oncology  [facility]    Total time spent: 25 minutes in patient evaluation, discussion of plan of care, and documentation.    Gayla is a 50 year old who is being evaluated via a billable video visit.      How would you like to obtain your AVS? MyChart  If the video visit is dropped, the invitation should be resent by: Text to cell phone: [phone]  Will anyone else be joining your video visit? No      Video Start Time:   Video-Visit Details    Type of service:  Video Visit    Video End Time:    Originating Location (pt. Location): Home    Distant Location (provider location):  [facility]     Platform used for Video Visit: Kristan      Again, thank you for allowing me to participate in the care of your patient.         Sincerely,        Jennifer Torrez MD

## 2021-09-25 ENCOUNTER — HEALTH MAINTENANCE LETTER (OUTPATIENT)
Age: 51
End: 2021-09-25

## 2021-10-21 ENCOUNTER — ANCILLARY PROCEDURE (OUTPATIENT)
Dept: MAMMOGRAPHY | Facility: CLINIC | Age: 51
End: 2021-10-21
Attending: INTERNAL MEDICINE
Payer: COMMERCIAL

## 2021-10-21 DIAGNOSIS — R92.30 DENSE BREAST TISSUE: ICD-10-CM

## 2021-10-21 DIAGNOSIS — Z80.3 FAMILY HISTORY OF MALIGNANT NEOPLASM OF BREAST: ICD-10-CM

## 2021-10-21 DIAGNOSIS — Z91.89 AT HIGH RISK FOR BREAST CANCER: ICD-10-CM

## 2021-10-21 PROCEDURE — 77067 SCR MAMMO BI INCL CAD: CPT | Mod: TC | Performed by: RADIOLOGY

## 2021-10-21 PROCEDURE — 77063 BREAST TOMOSYNTHESIS BI: CPT | Mod: TC | Performed by: RADIOLOGY

## 2021-11-20 ENCOUNTER — HEALTH MAINTENANCE LETTER (OUTPATIENT)
Age: 51
End: 2021-11-20

## 2021-11-30 NOTE — PROGRESS NOTES
[facility]    Hematology/Oncology Established Patient Follow-up Note    Today's Date: 12/8/2021    Reason for Follow-up: High risk surveillance for breast cancer.    HISTORY OF PRESENT ILLNESS: Gayla Evans is a 51 year old female who presents with a strong family history of breast cancer but no established genetic mutation.  Mother was diagnosed with an ER negative/[place] negative for centimeter breast cancer at the age of 60 and maternal grandmother had breast cancer age 60s, maternal aunt diagnosed with breast cancer age 40s.  Her mother tested negative for BRCA 1/2 gene mutations.  Her maternal aunt never had any BRCA gene mutation testing but has been contemplating it. Gayla has no children.  Her maternal aunt also has no children.    INTERIM HISTORY:  Gayla reports feeling well with no new breast issues.  Vaginal dryness has improved a bit with vaginal estrogen cream. 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      REVIEW OF SYSTEMS:   14 point ROS was reviewed and is negative other than as noted above in HPI.       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    Gynecologic history: Age of menarche around 8 or 9 years old, G0, P0, no history of hormone replacement therapy.    PAST SURGICAL HISTORY:  Removal of polyps in the uterus in 2013.    SOCIAL HISTORY:  Social History     Socioeconomic History     Marital status:      Spouse name: Not on file     Number of children: Not  on file     Years of education: Not on file     Highest education level: Not on file   Occupational History     Not on file   Tobacco Use     Smoking status: Former Smoker     Quit date: 2000     Years since quittin.9     Smokeless tobacco: Never Used   Substance and Sexual Activity     Alcohol use: Yes     Comment: 1-2 times per week, 2 drinks per time     Drug use: No     Sexual activity: Yes     Partners: Male   Other Topics Concern     Parent/sibling w/ CABG, MI or angioplasty before 65F 55M? Not Asked   Social History Narrative     Not on file     Social Determinants of Health     Financial Resource Strain: Not on file   Food Insecurity: Not on file   Transportation Needs: Not on file   Physical Activity: Not on file   Stress: Not on file   Social Connections: Not on file   Intimate Partner Violence: Not on file   Housing Stability: Not on file    and retired.  Previously smoked casually in her teens to 20s but no longer smokes.  Has about 6-8 servings of wine or beer per week.      FAMILY HISTORY:  Family History   Problem Relation Age of Onset     Breast Cancer Mother      Hypertension Father      Breast Cancer Maternal Aunt      Breast Cancer Maternal Aunt      Breast Cancer Maternal Grandmother      Diabetes Maternal Grandfather      Other Cancer Paternal Grandmother         gall bladder cancer     Hypertension Paternal Grandfather    Mother had breast cancer diagnosed age 60 that was ER negative, still living.  Maternal grandmother diagnosed with breast cancer age 60s.  Paternal grandmother diagnosed with bile duct cancer age 80s.  She has 1 brother and 1 sister with no medical issues.  She has 2 stepsons with no medical problems.  Maternal aunt was diagnosed with breast cancer age 40s and a second breast cancer age 60s, and another maternal aunt diagnosed with breast cancer age 60s with inflammatory type and passed away from her disease in late 2016.  Father  from multiple myeloma in  2020.      PHYSICAL EXAM:  Vital signs:  /84   Pulse 65   Resp 16   Wt 78.6 kg (173 lb 3.2 oz)   SpO2 99%   BMI 27.13 kg/m     GENERAL/CONSTITUTIONAL: No acute distress.  EYES:  No scleral icterus.  LYMPH: No cervical, supraclavicular, axillary adenopathy.   BREAST: Right with no palpable mass, ulceration, or rash.  Left with no palpable mass, ulceration or rash.  Nipples are everted bilaterally with no discharge.  PULMONARY: No audible cough or wheezing.  MUSCULOSKELETAL: Warm and well-perfused, no cyanosis, clubbing, or edema.  INTEGUMENTARY: No rashes or jaundice.  GAIT: Steady, does not use assistive device    LABS:  None.    PATHOLOGY:  None.    IMAGING:  10/21/2021 Mammogram showed no suspicious findings.    4/16/2021: Breast MRI showed no suspicious findings.    ASSESSMENT/PLAN:  Gayla Evans is a 51 year old female with the following issues:  1.  High risk for breast cancer due to strong family history of breast cancer  2.  Dense breasts, limiting sensitivity of mammogram detection  3. Vaginal dryness  -I discussed with Gayla that she has no clinical evidence for breast abnormalities by physical exam from today or mammogram reviewed from 10/21/2021.  -Given her breast density and strong family history of breast cancer, I recommended continuing alternating mammograms with breast MRI.   -Continue use of vaginal estrogen cream maintenance 1-2 times per week as the systemic absorption is very low/minimal.    4.  History of melanoma of left calf and basal cell skin carcinoma  -Continue routine skin checks with dermatology.    Return in 1 year.    Jennifer Torrez MD  Hematology/Oncology  [facility]

## 2021-12-08 ENCOUNTER — ONCOLOGY VISIT (OUTPATIENT)
Dept: ONCOLOGY | Facility: CLINIC | Age: 51
End: 2021-12-08
Attending: INTERNAL MEDICINE
Payer: COMMERCIAL

## 2021-12-08 VITALS
OXYGEN SATURATION: 99 % | WEIGHT: 173.2 LBS | BODY MASS INDEX: 27.13 KG/M2 | DIASTOLIC BLOOD PRESSURE: 84 MMHG | HEART RATE: 65 BPM | SYSTOLIC BLOOD PRESSURE: 139 MMHG | RESPIRATION RATE: 16 BRPM

## 2021-12-08 DIAGNOSIS — Z91.89 AT HIGH RISK FOR BREAST CANCER: Primary | ICD-10-CM

## 2021-12-08 DIAGNOSIS — R92.30 DENSE BREAST TISSUE: ICD-10-CM

## 2021-12-08 DIAGNOSIS — Z12.31 ENCOUNTER FOR SCREENING MAMMOGRAM FOR BREAST CANCER: ICD-10-CM

## 2021-12-08 DIAGNOSIS — N89.8 VAGINAL DRYNESS: ICD-10-CM

## 2021-12-08 DIAGNOSIS — Z80.3 FAMILY HISTORY OF MALIGNANT NEOPLASM OF BREAST: ICD-10-CM

## 2021-12-08 PROCEDURE — G0463 HOSPITAL OUTPT CLINIC VISIT: HCPCS

## 2021-12-08 PROCEDURE — 99213 OFFICE O/P EST LOW 20 MIN: CPT | Performed by: INTERNAL MEDICINE

## 2021-12-08 ASSESSMENT — PAIN SCALES - GENERAL: PAINLEVEL: NO PAIN (0)

## 2021-12-08 NOTE — LETTER
12/8/2021         RE: Gayla Evans  [address] [place] [address]        Dear Colleague,    Thank you for referring your patient, Gayla Evans, to the [facility] [place]. Please see a copy of my visit note below.    [facility]    Hematology/Oncology Established Patient Follow-up Note    Today's Date: 12/8/2021    Reason for Follow-up: High risk surveillance for breast cancer.    HISTORY OF PRESENT ILLNESS: Gayla Evans is a 51 year old female who presents with a strong family history of breast cancer but no established genetic mutation.  Mother was diagnosed with an ER negative/NM negative for centimeter breast cancer at the age of 60 and maternal grandmother had breast cancer age 60s, maternal aunt diagnosed with breast cancer age 40s.  Her mother tested negative for BRCA 1/2 gene mutations.  Her maternal aunt never had any BRCA gene mutation testing but has been contemplating it. Gayla has no children.  Her maternal aunt also has no children.    INTERIM HISTORY:  Gayla reports feeling well with no new breast issues.  Vaginal dryness has improved a bit with vaginal estrogen cream. 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      REVIEW OF SYSTEMS:   14 point ROS was reviewed and is negative other than as noted above in HPI.       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    Gynecologic history: Age of menarche around 8 or 9 years old, G0, P0, no  history of hormone replacement therapy.    PAST SURGICAL HISTORY:  Removal of polyps in the uterus in 2013.    SOCIAL HISTORY:  Social History     Socioeconomic History     Marital status:      Spouse name: Not on file     Number of children: Not on file     Years of education: Not on file     Highest education level: Not on file   Occupational History     Not on file   Tobacco Use     Smoking status: Former Smoker     Quit date: 2000     Years since quittin.9     Smokeless tobacco: Never Used   Substance and Sexual Activity     Alcohol use: Yes     Comment: 1-2 times per week, 2 drinks per time     Drug use: No     Sexual activity: Yes     Partners: Male   Other Topics Concern     Parent/sibling w/ CABG, MI or angioplasty before 65F 55M? Not Asked   Social History Narrative     Not on file     Social Determinants of Health     Financial Resource Strain: Not on file   Food Insecurity: Not on file   Transportation Needs: Not on file   Physical Activity: Not on file   Stress: Not on file   Social Connections: Not on file   Intimate Partner Violence: Not on file   Housing Stability: Not on file    and retired.  Previously smoked casually in her teens to 20s but no longer smokes.  Has about 6-8 servings of wine or beer per week.      FAMILY HISTORY:  Family History   Problem Relation Age of Onset     Breast Cancer Mother      Hypertension Father      Breast Cancer Maternal Aunt      Breast Cancer Maternal Aunt      Breast Cancer Maternal Grandmother      Diabetes Maternal Grandfather      Other Cancer Paternal Grandmother         gall bladder cancer     Hypertension Paternal Grandfather    Mother had breast cancer diagnosed age 60 that was ER negative, still living.  Maternal grandmother diagnosed with breast cancer age 60s.  Paternal grandmother diagnosed with bile duct cancer age 80s.  She has 1 brother and 1 sister with no medical issues.  She has 2 stepsons with no medical problems.  Maternal  aunt was diagnosed with breast cancer age 40s and a second breast cancer age 60s, and another maternal aunt diagnosed with breast cancer age 60s with inflammatory type and passed away from her disease in late 2016.  Father  from multiple myeloma in .      PHYSICAL EXAM:  Vital signs:  /84   Pulse 65   Resp 16   Wt 78.6 kg (173 lb 3.2 oz)   SpO2 99%   BMI 27.13 kg/m     GENERAL/CONSTITUTIONAL: No acute distress.  EYES:  No scleral icterus.  LYMPH: No cervical, supraclavicular, axillary adenopathy.   BREAST: Right with no palpable mass, ulceration, or rash.  Left with no palpable mass, ulceration or rash.  Nipples are everted bilaterally with no discharge.  PULMONARY: No audible cough or wheezing.  MUSCULOSKELETAL: Warm and well-perfused, no cyanosis, clubbing, or edema.  INTEGUMENTARY: No rashes or jaundice.  GAIT: Steady, does not use assistive device    LABS:  None.    PATHOLOGY:  None.    IMAGING:  10/21/2021 Mammogram showed no suspicious findings.    2021: Breast MRI showed no suspicious findings.    ASSESSMENT/PLAN:  Gayla Evans is a 51 year old female with the following issues:  1.  High risk for breast cancer due to strong family history of breast cancer  2.  Dense breasts, limiting sensitivity of mammogram detection  3. Vaginal dryness  -I discussed with Gayla that she has no clinical evidence for breast abnormalities by physical exam from today or mammogram reviewed from 10/21/2021.  -Given her breast density and strong family history of breast cancer, I recommended continuing alternating mammograms with breast MRI.   -Continue use of vaginal estrogen cream maintenance 1-2 times per week as the systemic absorption is very low/minimal.    4.  History of melanoma of left calf and basal cell skin carcinoma  -Continue routine skin checks with dermatology.    Return in 1 year.    Jennifer Torrez MD  Hematology/Oncology  [facility]    Oncology Rooming  "Note    December 8, 2021 10:52 AM   Gayla Evans is a 51 year old female who presents for:    Chief Complaint   Patient presents with     Oncology Clinic Visit     Initial Vitals: /84   Pulse 65   Resp 16   Wt 78.6 kg (173 lb 3.2 oz)   SpO2 99%   BMI 27.13 kg/m   Estimated body mass index is 27.13 kg/m  as calculated from the following:    Height as of 11/2/20: 1.702 m (5' 7\").    Weight as of this encounter: 78.6 kg (173 lb 3.2 oz). Body surface area is 1.93 meters squared.  No Pain (0) Comment: Data Unavailable   No LMP recorded.  Allergies reviewed: Yes  Medications reviewed: Yes    Medications: Medication refills not needed today.  Pharmacy name entered into [employer] [id number] - [place] - [address]    Clinical concerns:  DOCTOR was notified.      Suzanna Penaloza CMA                Again, thank you for allowing me to participate in the care of your patient.        Sincerely,        Jennifer Torrez MD    "

## 2021-12-08 NOTE — PROGRESS NOTES
"Oncology Rooming Note    December 8, 2021 10:52 AM   Gayla Evans is a 51 year old female who presents for:    Chief Complaint   Patient presents with     Oncology Clinic Visit     Initial Vitals: /84   Pulse 65   Resp 16   Wt 78.6 kg (173 lb 3.2 oz)   SpO2 99%   BMI 27.13 kg/m   Estimated body mass index is 27.13 kg/m  as calculated from the following:    Height as of 11/2/20: 1.702 m (5' 7\").    Weight as of this encounter: 78.6 kg (173 lb 3.2 oz). Body surface area is 1.93 meters squared.  No Pain (0) Comment: Data Unavailable   No LMP recorded.  Allergies reviewed: Yes  Medications reviewed: Yes    Medications: Medication refills not needed today.  Pharmacy name entered into [employer]: [facility] #[id number] - [place], [place] - [address]    Clinical concerns:  DOCTOR was notified.      Suzanna Penaloza, AMANDA            "

## 2022-04-21 ENCOUNTER — HOSPITAL ENCOUNTER (OUTPATIENT)
Dept: MRI IMAGING | Facility: CLINIC | Age: 52
Discharge: HOME | End: 2022-04-21
Attending: INTERNAL MEDICINE | Admitting: INTERNAL MEDICINE
Payer: COMMERCIAL

## 2022-04-21 DIAGNOSIS — R92.30 DENSE BREAST TISSUE: ICD-10-CM

## 2022-04-21 DIAGNOSIS — Z91.89 AT HIGH RISK FOR BREAST CANCER: ICD-10-CM

## 2022-04-21 DIAGNOSIS — Z80.3 FAMILY HISTORY OF MALIGNANT NEOPLASM OF BREAST: ICD-10-CM

## 2022-04-21 PROCEDURE — A9585 GADOBUTROL INJECTION: HCPCS | Performed by: INTERNAL MEDICINE

## 2022-04-21 PROCEDURE — 77049 MRI BREAST C-+ W/CAD BI: CPT

## 2022-04-21 PROCEDURE — 255N000002 HC RX 255 OP 636: Performed by: INTERNAL MEDICINE

## 2022-04-21 RX ORDER — GADOBUTROL 604.72 MG/ML
10 INJECTION INTRAVENOUS ONCE
Status: COMPLETED | OUTPATIENT
Start: 2022-04-21 | End: 2022-04-21

## 2022-04-21 RX ADMIN — GADOBUTROL 8 ML: 604.72 INJECTION INTRAVENOUS at 13:43

## 2022-10-31 ENCOUNTER — ANCILLARY PROCEDURE (OUTPATIENT)
Dept: MAMMOGRAPHY | Facility: CLINIC | Age: 52
End: 2022-10-31
Attending: INTERNAL MEDICINE
Payer: COMMERCIAL

## 2022-10-31 DIAGNOSIS — Z12.31 ENCOUNTER FOR SCREENING MAMMOGRAM FOR BREAST CANCER: ICD-10-CM

## 2022-10-31 DIAGNOSIS — Z80.3 FAMILY HISTORY OF MALIGNANT NEOPLASM OF BREAST: ICD-10-CM

## 2022-10-31 DIAGNOSIS — Z91.89 AT HIGH RISK FOR BREAST CANCER: ICD-10-CM

## 2022-10-31 PROCEDURE — 77063 BREAST TOMOSYNTHESIS BI: CPT | Mod: TC | Performed by: RADIOLOGY

## 2022-10-31 PROCEDURE — 77067 SCR MAMMO BI INCL CAD: CPT | Mod: TC | Performed by: RADIOLOGY

## 2022-11-18 NOTE — PROGRESS NOTES
[facility]    Hematology/Oncology Established Patient Follow-up Note    Today's Date: 12/5/2022    Reason for Follow-up: High risk surveillance for breast cancer.    HISTORY OF PRESENT ILLNESS: Gayla Evans is a 52 year old female who presents with a strong family history of breast cancer but no established genetic mutation.  Mother was diagnosed with an ER negative/[place] negative for centimeter breast cancer at the age of 60 and maternal grandmother had breast cancer age 60s, maternal aunt diagnosed with breast cancer age 40s.  Her mother tested negative for BRCA 1/2 gene mutations.  Her maternal aunt never had any BRCA gene mutation testing but has been contemplating it. Gayla has no children.  Her maternal aunt also has no children.    INTERIM HISTORY:  Gayla reports feeling overall well with no new breast issues.  She is retired and has been doing non-profit work as well as traveling.      REVIEW OF SYSTEMS:   14 point ROS was reviewed and is negative other than as noted above in HPI.       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    Gynecologic history: Age of menarche around 8 or 9 years old, G0, P0, no history of hormone replacement therapy.    PAST SURGICAL HISTORY:  Removal of polyps in the uterus in 2013.    SOCIAL HISTORY:  Social History     Socioeconomic History     Marital status:      Spouse name: Not on file     Number of children: Not on file     Years of education: Not on file     Highest education level: Not on file   Occupational History     Not on file   Tobacco Use     Smoking status: Never      "Smokeless tobacco: Never   Substance and Sexual Activity     Alcohol use: Yes     Comment: 1-2 times per week, 2 drinks per time     Drug use: No     Sexual activity: Yes     Partners: Male   Other Topics Concern     Parent/sibling w/ CABG, MI or angioplasty before 65F 55M? Not Asked   Social History Narrative     Not on file     Social Determinants of Health     Financial Resource Strain: Not on file   Food Insecurity: Not on file   Transportation Needs: Not on file   Physical Activity: Not on file   Stress: Not on file   Social Connections: Not on file   Intimate Partner Violence: Not on file   Housing Stability: Not on file    and retired.  Previously smoked casually in her teens to 20s but no longer smokes.  Has about 6-8 servings of wine or beer per week.      FAMILY HISTORY:  Family History   Problem Relation Age of Onset     Breast Cancer Mother      Hypertension Father      Breast Cancer Maternal Aunt      Breast Cancer Maternal Aunt      Breast Cancer Maternal Grandmother      Diabetes Maternal Grandfather      Other Cancer Paternal Grandmother         gall bladder cancer     Hypertension Paternal Grandfather    Mother had breast cancer diagnosed age 60 that was ER negative, still living.  Maternal grandmother diagnosed with breast cancer age 60s.  Paternal grandmother diagnosed with bile duct cancer age 80s.  She has 1 brother and 1 sister with no medical issues.  She has 2 stepsons with no medical problems.  Maternal aunt was diagnosed with breast cancer age 40s and a second breast cancer age 60s, and another maternal aunt diagnosed with breast cancer age 60s with inflammatory type and passed away from her disease in late 2016.  Father  from multiple myeloma in .      PHYSICAL EXAM:  Vital signs:  /86   Pulse 66   Resp 16   Ht 1.702 m (5' 7\")   SpO2 99%   BMI 27.13 kg/m     GENERAL/CONSTITUTIONAL: No acute distress.  EYES:  No scleral icterus.  LYMPH: No cervical, " supraclavicular, axillary adenopathy.   BREAST: No palpable masses in either breast. Nipples are everted bilaterally with no discharge. No erythema, ulceration, or dimpling of the skin.  PULMONARY: No audible cough or wheezing.  MUSCULOSKELETAL: Warm and well-perfused, no cyanosis, clubbing, or edema.  INTEGUMENTARY: No rashes or jaundice.  GAIT: Steady, does not use assistive device    LABS:  None.    PATHOLOGY:  None.    IMAGING:  10/31/2022 Mammogram showed no malignant findings.    4/21/2022: Breast MRI showed no malignant findings. Focal skin uptake along inferior breast.    ASSESSMENT/PLAN:  Gayla Evans is a 52 year old female with the following issues:  1.  High risk for breast cancer due to strong family history of breast cancer  2.  Dense breasts, limiting sensitivity of mammogram detection  -I discussed with Gayla that she has no clinical evidence for breast abnormalities by physical exam from today or mammogram reviewed from 10/31/2022.  -Given her breast density and strong family history of breast cancer, I recommended continuing alternating mammograms with breast MRI.     3. Vaginal dryness  -Continue use of vaginal estrogen tablet maintenance 1-2 times per week as the systemic absorption is very low/minimal. She is using a convenient applicator called [employer].    4.  History of melanoma of left calf and basal cell skin carcinoma  -Continue routine skin checks with dermatology.    Return in 1 year.    Jennifer Torrez MD  Hematology/Oncology  [facility]    Total time spent: 20 minutes in patient evaluation, counseling, documentation, and coordination of care.

## 2022-12-05 ENCOUNTER — ONCOLOGY VISIT (OUTPATIENT)
Dept: ONCOLOGY | Facility: CLINIC | Age: 52
End: 2022-12-05
Attending: INTERNAL MEDICINE
Payer: COMMERCIAL

## 2022-12-05 VITALS
SYSTOLIC BLOOD PRESSURE: 128 MMHG | RESPIRATION RATE: 16 BRPM | DIASTOLIC BLOOD PRESSURE: 86 MMHG | BODY MASS INDEX: 27.13 KG/M2 | HEART RATE: 66 BPM | OXYGEN SATURATION: 99 % | HEIGHT: 67 IN

## 2022-12-05 DIAGNOSIS — N89.8 VAGINAL DRYNESS: ICD-10-CM

## 2022-12-05 DIAGNOSIS — Z80.3 FAMILY HISTORY OF MALIGNANT NEOPLASM OF BREAST: ICD-10-CM

## 2022-12-05 DIAGNOSIS — Z91.89 AT HIGH RISK FOR BREAST CANCER: Primary | ICD-10-CM

## 2022-12-05 DIAGNOSIS — R92.30 DENSE BREAST TISSUE: ICD-10-CM

## 2022-12-05 DIAGNOSIS — Z12.31 ENCOUNTER FOR SCREENING MAMMOGRAM FOR BREAST CANCER: ICD-10-CM

## 2022-12-05 PROCEDURE — G0463 HOSPITAL OUTPT CLINIC VISIT: HCPCS

## 2022-12-05 PROCEDURE — 99213 OFFICE O/P EST LOW 20 MIN: CPT | Performed by: INTERNAL MEDICINE

## 2022-12-05 RX ORDER — ESTRADIOL 10 UG/1
INSERT VAGINAL
COMMUNITY
Start: 2022-11-03

## 2022-12-05 ASSESSMENT — PAIN SCALES - GENERAL: PAINLEVEL: NO PAIN (0)

## 2022-12-05 NOTE — LETTER
12/5/2022         RE: Gayla Evans  [address] [place] [address]        Dear Colleague,    Thank you for referring your patient, Gayla Evans, to the [facility]. Please see a copy of my visit note below.    [facility]    Hematology/Oncology Established Patient Follow-up Note    Today's Date: 12/5/2022    Reason for Follow-up: High risk surveillance for breast cancer.    HISTORY OF PRESENT ILLNESS: Gayla Evans is a 52 year old female who presents with a strong family history of breast cancer but no established genetic mutation.  Mother was diagnosed with an ER negative/[place] negative for centimeter breast cancer at the age of 60 and maternal grandmother had breast cancer age 60s, maternal aunt diagnosed with breast cancer age 40s.  Her mother tested negative for BRCA 1/2 gene mutations.  Her maternal aunt never had any BRCA gene mutation testing but has been contemplating it. Gayla has no children.  Her maternal aunt also has no children.    INTERIM HISTORY:  Gayla reports feeling overall well with no new breast issues.  She is retired and has been doing non-profit work as well as traveling.      REVIEW OF SYSTEMS:   14 point ROS was reviewed and is negative other than as noted above in HPI.       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    Gynecologic history: Age of menarche around 8 or 9 years old, G0, P0, no history of hormone replacement therapy.    PAST SURGICAL HISTORY:  Removal of polyps in the uterus in 2013.    SOCIAL HISTORY:  Social History     Socioeconomic History      Marital status:      Spouse name: Not on file     Number of children: Not on file     Years of education: Not on file     Highest education level: Not on file   Occupational History     Not on file   Tobacco Use     Smoking status: Never     Smokeless tobacco: Never   Substance and Sexual Activity     Alcohol use: Yes     Comment: 1-2 times per week, 2 drinks per time     Drug use: No     Sexual activity: Yes     Partners: Male   Other Topics Concern     Parent/sibling w/ CABG, MI or angioplasty before 65F 55M? Not Asked   Social History Narrative     Not on file     Social Determinants of Health     Financial Resource Strain: Not on file   Food Insecurity: Not on file   Transportation Needs: Not on file   Physical Activity: Not on file   Stress: Not on file   Social Connections: Not on file   Intimate Partner Violence: Not on file   Housing Stability: Not on file    and retired.  Previously smoked casually in her teens to 20s but no longer smokes.  Has about 6-8 servings of wine or beer per week.      FAMILY HISTORY:  Family History   Problem Relation Age of Onset     Breast Cancer Mother      Hypertension Father      Breast Cancer Maternal Aunt      Breast Cancer Maternal Aunt      Breast Cancer Maternal Grandmother      Diabetes Maternal Grandfather      Other Cancer Paternal Grandmother         gall bladder cancer     Hypertension Paternal Grandfather    Mother had breast cancer diagnosed age 60 that was ER negative, still living.  Maternal grandmother diagnosed with breast cancer age 60s.  Paternal grandmother diagnosed with bile duct cancer age 80s.  She has 1 brother and 1 sister with no medical issues.  She has 2 stepsons with no medical problems.  Maternal aunt was diagnosed with breast cancer age 40s and a second breast cancer age 60s, and another maternal aunt diagnosed with breast cancer age 60s with inflammatory type and passed away from her disease in late 2016.  Father  from multiple  "myeloma in 2020.      PHYSICAL EXAM:  Vital signs:  /86   Pulse 66   Resp 16   Ht 1.702 m (5' 7\")   SpO2 99%   BMI 27.13 kg/m     GENERAL/CONSTITUTIONAL: No acute distress.  EYES:  No scleral icterus.  LYMPH: No cervical, supraclavicular, axillary adenopathy.   BREAST: No palpable masses in either breast. Nipples are everted bilaterally with no discharge. No erythema, ulceration, or dimpling of the skin.  PULMONARY: No audible cough or wheezing.  MUSCULOSKELETAL: Warm and well-perfused, no cyanosis, clubbing, or edema.  INTEGUMENTARY: No rashes or jaundice.  GAIT: Steady, does not use assistive device    LABS:  None.    PATHOLOGY:  None.    IMAGING:  10/31/2022 Mammogram showed no malignant findings.    4/21/2022: Breast MRI showed no malignant findings. Focal skin uptake along inferior breast.    ASSESSMENT/PLAN:  Gayla Evans is a 52 year old female with the following issues:  1.  High risk for breast cancer due to strong family history of breast cancer  2.  Dense breasts, limiting sensitivity of mammogram detection  -I discussed with Gayla that she has no clinical evidence for breast abnormalities by physical exam from today or mammogram reviewed from 10/31/2022.  -Given her breast density and strong family history of breast cancer, I recommended continuing alternating mammograms with breast MRI.     3. Vaginal dryness  -Continue use of vaginal estrogen tablet maintenance 1-2 times per week as the systemic absorption is very low/minimal. She is using a convenient applicator called Data[employer].    4.  History of melanoma of left calf and basal cell skin carcinoma  -Continue routine skin checks with dermatology.    Return in 1 year.    Jennifer Torrez MD  Hematology/Oncology  [facility]    Total time spent: 20 minutes in patient evaluation, counseling, documentation, and coordination of care.    Oncology Rooming Note    December 5, 2022 10:25 AM   Gayla Evans is a 52 year " "old female who presents for:    Chief Complaint   Patient presents with     Oncology Clinic Visit     Initial Vitals: There were no vitals taken for this visit. Estimated body mass index is 27.13 kg/m  as calculated from the following:    Height as of 11/2/20: 1.702 m (5' 7\").    Weight as of 12/8/21: 78.6 kg (173 lb 3.2 oz). There is no height or weight on file to calculate BSA.  Data Unavailable Comment: Data Unavailable   No LMP recorded.  Allergies reviewed: Yes  Medications reviewed: Yes    Medications: Medication refills not needed today.  Pharmacy name entered into [employer]: [id number] - [place], [place] - [address]    Clinical concerns:  doctor was notified.      Rita Oh MA                Again, thank you for allowing me to participate in the care of your patient.        Sincerely,        Jennifer Torrez MD    "

## 2022-12-05 NOTE — PROGRESS NOTES
"Oncology Rooming Note    December 5, 2022 10:25 AM   Gayla Evans is a 52 year old female who presents for:    Chief Complaint   Patient presents with     Oncology Clinic Visit     Initial Vitals: There were no vitals taken for this visit. Estimated body mass index is 27.13 kg/m  as calculated from the following:    Height as of 11/2/20: 1.702 m (5' 7\").    Weight as of 12/8/21: 78.6 kg (173 lb 3.2 oz). There is no height or weight on file to calculate BSA.  Data Unavailable Comment: Data Unavailable   No LMP recorded.  Allergies reviewed: Yes  Medications reviewed: Yes    Medications: Medication refills not needed today.  Pharmacy name entered into [employer]: [facility] #[id number] - [place], [place] - [address]    Clinical concerns:  doctor was notified.      Rita Oh MA            "

## 2023-04-18 ENCOUNTER — HOSPITAL ENCOUNTER (OUTPATIENT)
Dept: MRI IMAGING | Facility: CLINIC | Age: 53
Discharge: HOME | End: 2023-04-18
Attending: INTERNAL MEDICINE | Admitting: INTERNAL MEDICINE
Payer: COMMERCIAL

## 2023-04-18 DIAGNOSIS — Z91.89 AT HIGH RISK FOR BREAST CANCER: ICD-10-CM

## 2023-04-18 DIAGNOSIS — Z80.3 FAMILY HISTORY OF MALIGNANT NEOPLASM OF BREAST: ICD-10-CM

## 2023-04-18 DIAGNOSIS — R92.30 DENSE BREAST TISSUE: ICD-10-CM

## 2023-04-18 PROCEDURE — 77049 MRI BREAST C-+ W/CAD BI: CPT

## 2023-04-18 PROCEDURE — 255N000002 HC RX 255 OP 636: Performed by: INTERNAL MEDICINE

## 2023-04-18 PROCEDURE — A9585 GADOBUTROL INJECTION: HCPCS | Performed by: INTERNAL MEDICINE

## 2023-04-18 RX ORDER — GADOBUTROL 604.72 MG/ML
8 INJECTION INTRAVENOUS ONCE
Status: COMPLETED | OUTPATIENT
Start: 2023-04-18 | End: 2023-04-18

## 2023-04-18 RX ADMIN — GADOBUTROL 8 ML: 604.72 INJECTION INTRAVENOUS at 08:42

## 2023-11-14 ENCOUNTER — ANCILLARY PROCEDURE (OUTPATIENT)
Dept: MAMMOGRAPHY | Facility: CLINIC | Age: 53
End: 2023-11-14
Attending: INTERNAL MEDICINE
Payer: COMMERCIAL

## 2023-11-14 DIAGNOSIS — R92.30 DENSE BREAST TISSUE: ICD-10-CM

## 2023-11-14 DIAGNOSIS — Z91.89 AT HIGH RISK FOR BREAST CANCER: ICD-10-CM

## 2023-11-14 DIAGNOSIS — Z80.3 FAMILY HISTORY OF MALIGNANT NEOPLASM OF BREAST: ICD-10-CM

## 2023-11-14 DIAGNOSIS — Z12.31 ENCOUNTER FOR SCREENING MAMMOGRAM FOR BREAST CANCER: ICD-10-CM

## 2023-11-14 PROCEDURE — 77067 SCR MAMMO BI INCL CAD: CPT | Mod: TC | Performed by: RADIOLOGY

## 2023-11-14 PROCEDURE — 77063 BREAST TOMOSYNTHESIS BI: CPT | Mod: TC | Performed by: RADIOLOGY

## 2023-11-30 NOTE — PROGRESS NOTES
[facility]    Hematology/Oncology Established Patient Follow-up Note    Today's Date: 12/4/2023    Reason for Follow-up: High risk surveillance for breast cancer.    HISTORY OF PRESENT ILLNESS: Gayla Evans is a 53 year old female who presents with a strong family history of breast cancer but no established genetic mutation.  Mother was diagnosed with an ER negative/NM negative for centimeter breast cancer at the age of 60 and maternal grandmother had breast cancer age 60s, maternal aunt diagnosed with breast cancer age 40s.  Her mother tested negative for BRCA 1/2 gene mutations.  Her maternal aunt never had any BRCA gene mutation testing but has been contemplating it. Gayla has no children.  Her maternal aunt also has no children.    INTERIM HISTORY:  Gayla reports feeling well with no new breast issues.  She reports difficulty with staying asleep at night and wakes at 2am most nights.    REVIEW OF SYSTEMS:   14 point ROS was reviewed and is negative other than as noted above in HPI.       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    Gynecologic history: Age of menarche around 8 or 9 years old, G0, P0, no history of hormone replacement therapy.    PAST SURGICAL HISTORY:  Removal of polyps in the uterus in 2013.    SOCIAL HISTORY:  Social History     Socioeconomic History    Marital status:      Spouse name: Not on file    Number of children: Not on file    Years of education: Not on file    Highest education level: Not on file   Occupational History    Not on file   Tobacco Use    Smoking status: Never    Smokeless  "tobacco: Never   Substance and Sexual Activity    Alcohol use: Yes     Comment: 1-2 times per week, 2 drinks per time    Drug use: No    Sexual activity: Yes     Partners: Male   Other Topics Concern    Parent/sibling w/ CABG, MI or angioplasty before 65F 55M? Not Asked   Social History Narrative    Not on file     Social Determinants of Health     Financial Resource Strain: Not on file   Food Insecurity: Not on file   Transportation Needs: Not on file   Physical Activity: Not on file   Stress: Not on file   Social Connections: Not on file   Interpersonal Safety: Not on file   Housing Stability: Not on file    and retired.  Previously smoked casually in her teens to 20s but no longer smokes.  Has about 6-8 servings of wine or beer per week.      FAMILY HISTORY:  Family History   Problem Relation Age of Onset    Breast Cancer Mother     Hypertension Father     Breast Cancer Maternal Aunt     Breast Cancer Maternal Aunt     Breast Cancer Maternal Grandmother     Diabetes Maternal Grandfather     Other Cancer Paternal Grandmother         gall bladder cancer    Hypertension Paternal Grandfather    Mother had breast cancer diagnosed age 60 that was ER negative, still living.  Maternal grandmother diagnosed with breast cancer age 60s.  Paternal grandmother diagnosed with bile duct cancer age 80s.  She has 1 brother and 1 sister with no medical issues.  She has 2 stepsons with no medical problems.  Maternal aunt was diagnosed with breast cancer age 40s and a second breast cancer age 60s, and another maternal aunt diagnosed with breast cancer age 60s with inflammatory type and passed away from her disease in late 2016.  Father  from multiple myeloma in .      PHYSICAL EXAM:  Vital signs:  Pulse 82   Resp 16   Ht 1.702 m (5' 7\")   SpO2 97%   BMI 27.13 kg/m     GENERAL/CONSTITUTIONAL: No acute distress.  EYES:  No scleral icterus.  LYMPH: No cervical, supraclavicular, axillary adenopathy.   BREAST: No " palpable masses in either breast. Nipples are everted bilaterally with no discharge. No erythema, ulceration, or dimpling of the skin.  PULMONARY: No audible cough or wheezing.  MUSCULOSKELETAL: Warm and well-perfused, no cyanosis, clubbing, or edema.  INTEGUMENTARY: No rashes or jaundice.  GAIT: Steady, does not use assistive device    LABS:  None.    PATHOLOGY:  None.    IMAGIN2023 Mammogram showed no malignant findings.    2022: Breast MRI showed no malignant findings. Focal skin uptake along inferior breast.    ASSESSMENT/PLAN:  Gayla Evans is a 53 year old female with the following issues:  1.  High risk for breast cancer due to strong family history of breast cancer  2.  Dense breasts, limiting sensitivity of mammogram detection  -I discussed with Gayla that she has no clinical evidence for breast abnormalities by physical exam from today or mammogram reviewed from 2023.  -Given her breast density and strong family history of breast cancer, I recommended continuing alternating mammograms with breast MRI.     3. Vaginal dryness  -Continue use of vaginal estrogen tablet maintenance 1-2 times per week as the systemic absorption is very low/minimal. She is using a convenient applicator called [employer].    4.  History of melanoma of left calf and basal cell skin carcinoma  -Continue routine skin checks with dermatology.    5. Insomnia  --Gayla does not have difficulty with falling asleep but does have difficulty with staying asleep.   --She asked about trying a progesterone supplement to help her sleep.  I advised against this given her relative higher risk of developing breast cancer.  --Discussed trying non-hormone based methods, such as chamomile tea, tryptophan supplement, magnesium (at a higher dose).    Return in 1 year.    Jennifer Torrez MD  Hematology/Oncology  [facility]    Total time spent today: 30 minutes in chart review, patient evaluation,  counseling, documentation, test and/or medication/prescription orders, and coordination of care.

## 2023-12-02 ENCOUNTER — HEALTH MAINTENANCE LETTER (OUTPATIENT)
Age: 53
End: 2023-12-02

## 2023-12-04 ENCOUNTER — ONCOLOGY VISIT (OUTPATIENT)
Dept: ONCOLOGY | Facility: CLINIC | Age: 53
End: 2023-12-04
Attending: INTERNAL MEDICINE
Payer: COMMERCIAL

## 2023-12-04 VITALS — BODY MASS INDEX: 27.13 KG/M2 | OXYGEN SATURATION: 97 % | RESPIRATION RATE: 16 BRPM | HEIGHT: 67 IN | HEART RATE: 82 BPM

## 2023-12-04 DIAGNOSIS — Z91.89 AT HIGH RISK FOR BREAST CANCER: Primary | ICD-10-CM

## 2023-12-04 DIAGNOSIS — R92.30 DENSE BREAST TISSUE: ICD-10-CM

## 2023-12-04 DIAGNOSIS — Z80.3 FAMILY HISTORY OF MALIGNANT NEOPLASM OF BREAST: ICD-10-CM

## 2023-12-04 DIAGNOSIS — Z12.31 ENCOUNTER FOR SCREENING MAMMOGRAM FOR BREAST CANCER: ICD-10-CM

## 2023-12-04 DIAGNOSIS — G47.09 OTHER INSOMNIA: ICD-10-CM

## 2023-12-04 PROCEDURE — 99213 OFFICE O/P EST LOW 20 MIN: CPT | Performed by: INTERNAL MEDICINE

## 2023-12-04 PROCEDURE — 99214 OFFICE O/P EST MOD 30 MIN: CPT | Performed by: INTERNAL MEDICINE

## 2023-12-04 ASSESSMENT — PAIN SCALES - GENERAL: PAINLEVEL: NO PAIN (0)

## 2023-12-04 NOTE — PROGRESS NOTES
"Oncology Rooming Note    December 4, 2023 12:54 PM   Gayla Evans is a 53 year old female who presents for:    Chief Complaint   Patient presents with    Oncology Clinic Visit     Initial Vitals: Resp 16   Ht 1.702 m (5' 7\")   BMI 27.13 kg/m   Estimated body mass index is 27.13 kg/m  as calculated from the following:    Height as of this encounter: 1.702 m (5' 7\").    Weight as of 12/8/21: 78.6 kg (173 lb 3.2 oz). Body surface area is 1.93 meters squared.  No Pain (0) Comment: Data Unavailable   No LMP recorded.  Allergies reviewed: Yes  Medications reviewed: Yes    Medications: Medication refills not needed today.  Pharmacy name entered into [employer]: [id number] - [place], [place] - [address] AT [address] [place]            "

## 2023-12-04 NOTE — PATIENT INSTRUCTIONS
Arrange for breast MRI for 4/2024.  RTC MD in 1 year with mammogram prior to visit.   puncture/penetrating wound

## 2023-12-04 NOTE — LETTER
12/4/2023         RE: Gayla Evans  [address] [place] [address]        Dear Colleague,    Thank you for referring your patient, Gayla Evans, to the [facility]. Please see a copy of my visit note below.    [facility]    Hematology/Oncology Established Patient Follow-up Note    Today's Date: 12/4/2023    Reason for Follow-up: High risk surveillance for breast cancer.    HISTORY OF PRESENT ILLNESS: Gayla Evans is a 53 year old female who presents with a strong family history of breast cancer but no established genetic mutation.  Mother was diagnosed with an ER negative/[place] negative for centimeter breast cancer at the age of 60 and maternal grandmother had breast cancer age 60s, maternal aunt diagnosed with breast cancer age 40s.  Her mother tested negative for BRCA 1/2 gene mutations.  Her maternal aunt never had any BRCA gene mutation testing but has been contemplating it. Gayla has no children.  Her maternal aunt also has no children.    INTERIM HISTORY:  Gayla reports feeling well with no new breast issues.  She reports difficulty with staying asleep at night and wakes at 2am most nights.    REVIEW OF SYSTEMS:   14 point ROS was reviewed and is negative other than as noted above in HPI.       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    Gynecologic history: Age of menarche around 8 or 9 years old, G0, P0, no history of hormone replacement therapy.    PAST SURGICAL HISTORY:  Removal of polyps in the uterus in 2013.    SOCIAL HISTORY:  Social History     Socioeconomic History      Marital status:      Spouse name: Not on file     Number of children: Not on file     Years of education: Not on file     Highest education level: Not on file   Occupational History     Not on file   Tobacco Use     Smoking status: Never     Smokeless tobacco: Never   Substance and Sexual Activity     Alcohol use: Yes     Comment: 1-2 times per week, 2 drinks per time     Drug use: No     Sexual activity: Yes     Partners: Male   Other Topics Concern     Parent/sibling w/ CABG, MI or angioplasty before 65F 55M? Not Asked   Social History Narrative     Not on file     Social Determinants of Health     Financial Resource Strain: Not on file   Food Insecurity: Not on file   Transportation Needs: Not on file   Physical Activity: Not on file   Stress: Not on file   Social Connections: Not on file   Interpersonal Safety: Not on file   Housing Stability: Not on file    and retired.  Previously smoked casually in her teens to 20s but no longer smokes.  Has about 6-8 servings of wine or beer per week.      FAMILY HISTORY:  Family History   Problem Relation Age of Onset     Breast Cancer Mother      Hypertension Father      Breast Cancer Maternal Aunt      Breast Cancer Maternal Aunt      Breast Cancer Maternal Grandmother      Diabetes Maternal Grandfather      Other Cancer Paternal Grandmother         gall bladder cancer     Hypertension Paternal Grandfather    Mother had breast cancer diagnosed age 60 that was ER negative, still living.  Maternal grandmother diagnosed with breast cancer age 60s.  Paternal grandmother diagnosed with bile duct cancer age 80s.  She has 1 brother and 1 sister with no medical issues.  She has 2 stepsons with no medical problems.  Maternal aunt was diagnosed with breast cancer age 40s and a second breast cancer age 60s, and another maternal aunt diagnosed with breast cancer age 60s with inflammatory type and passed away from her disease in late 2016.  Father  from multiple  "myeloma in 2020.      PHYSICAL EXAM:  Vital signs:  Pulse 82   Resp 16   Ht 1.702 m (5' 7\")   SpO2 97%   BMI 27.13 kg/m     GENERAL/CONSTITUTIONAL: No acute distress.  EYES:  No scleral icterus.  LYMPH: No cervical, supraclavicular, axillary adenopathy.   BREAST: No palpable masses in either breast. Nipples are everted bilaterally with no discharge. No erythema, ulceration, or dimpling of the skin.  PULMONARY: No audible cough or wheezing.  MUSCULOSKELETAL: Warm and well-perfused, no cyanosis, clubbing, or edema.  INTEGUMENTARY: No rashes or jaundice.  GAIT: Steady, does not use assistive device    LABS:  None.    PATHOLOGY:  None.    IMAGIN2023 Mammogram showed no malignant findings.    2022: Breast MRI showed no malignant findings. Focal skin uptake along inferior breast.    ASSESSMENT/PLAN:  Gayla Evans is a 53 year old female with the following issues:  1.  High risk for breast cancer due to strong family history of breast cancer  2.  Dense breasts, limiting sensitivity of mammogram detection  -I discussed with Gayla that she has no clinical evidence for breast abnormalities by physical exam from today or mammogram reviewed from 2023.  -Given her breast density and strong family history of breast cancer, I recommended continuing alternating mammograms with breast MRI.     3. Vaginal dryness  -Continue use of vaginal estrogen tablet maintenance 1-2 times per week as the systemic absorption is very low/minimal. She is using a convenient applicator called [employer].    4.  History of melanoma of left calf and basal cell skin carcinoma  -Continue routine skin checks with dermatology.    5. Insomnia  --Gayla does not have difficulty with falling asleep but does have difficulty with staying asleep.   --She asked about trying a progesterone supplement to help her sleep.  I advised against this given her relative higher risk of developing breast cancer.  --Discussed trying non-hormone " "based methods, such as chamomile tea, tryptophan supplement, magnesium (at a higher dose).    Return in 1 year.    Jennifer Torrez MD  Hematology/Oncology  [facility]    Total time spent today: 30 minutes in chart review, patient evaluation, counseling, documentation, test and/or medication/prescription orders, and coordination of care.     Oncology Rooming Note    December 4, 2023 12:54 PM   Gayla Evans is a 53 year old female who presents for:    Chief Complaint   Patient presents with     Oncology Clinic Visit     Initial Vitals: Resp 16   Ht 1.702 m (5' 7\")   BMI 27.13 kg/m   Estimated body mass index is 27.13 kg/m  as calculated from the following:    Height as of this encounter: 1.702 m (5' 7\").    Weight as of 12/8/21: 78.6 kg (173 lb 3.2 oz). Body surface area is 1.93 meters squared.  No Pain (0) Comment: Data Unavailable   No LMP recorded.  Allergies reviewed: Yes  Medications reviewed: Yes    Medications: Medication refills not needed today.  Pharmacy name entered into [employer]: [id number] - [place], [place] - [address] AT [address]              Again, thank you for allowing me to participate in the care of your patient.        Sincerely,        Jennifer Torrez MD  "

## 2024-04-26 ENCOUNTER — HOSPITAL ENCOUNTER (OUTPATIENT)
Dept: MRI IMAGING | Facility: CLINIC | Age: 54
Discharge: HOME | End: 2024-04-26
Attending: INTERNAL MEDICINE | Admitting: INTERNAL MEDICINE
Payer: COMMERCIAL

## 2024-04-26 DIAGNOSIS — R92.30 DENSE BREAST TISSUE: ICD-10-CM

## 2024-04-26 DIAGNOSIS — Z80.3 FAMILY HISTORY OF MALIGNANT NEOPLASM OF BREAST: ICD-10-CM

## 2024-04-26 DIAGNOSIS — Z91.89 AT HIGH RISK FOR BREAST CANCER: ICD-10-CM

## 2024-04-26 PROCEDURE — A9585 GADOBUTROL INJECTION: HCPCS | Performed by: INTERNAL MEDICINE

## 2024-04-26 PROCEDURE — 77049 MRI BREAST C-+ W/CAD BI: CPT

## 2024-04-26 PROCEDURE — 255N000002 HC RX 255 OP 636: Performed by: INTERNAL MEDICINE

## 2024-04-26 RX ORDER — GADOBUTROL 604.72 MG/ML
8 INJECTION INTRAVENOUS ONCE
Status: COMPLETED | OUTPATIENT
Start: 2024-04-26 | End: 2024-04-26

## 2024-04-26 RX ADMIN — GADOBUTROL 8 ML: 604.72 INJECTION INTRAVENOUS at 11:44

## 2025-01-04 ENCOUNTER — HEALTH MAINTENANCE LETTER (OUTPATIENT)
Age: 55
End: 2025-01-04

## 2025-03-25 NOTE — PROGRESS NOTES
[facility]    Hematology/Oncology Established Patient Follow-up Note    Today's Date: 4/9/2025    Reason for Follow-up: High risk surveillance for breast cancer.    HISTORY OF PRESENT ILLNESS: Gayla Evans is a 54 year old female who presents with a strong family history of breast cancer but no established genetic mutation.  Mother was diagnosed with an ER negative/AL negative for centimeter breast cancer at the age of 60 and maternal grandmother had breast cancer age 60s, maternal aunt diagnosed with breast cancer age 40s.  Her mother tested negative for BRCA 1/2 gene mutations.  Her maternal aunt never had any BRCA gene mutation testing but has been contemplating it. Gayla has no children.  Her maternal aunt also has no children.    INTERVAL HISTORY:  Gayla reports feeling well with no new breast issues.  She spent 3 months in [place] and is planning for a trip to [facility] this winter.    REVIEW OF SYSTEMS:   14 point ROS was reviewed and is negative other than as noted above in HPI.       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    Gynecologic history: Age of menarche around 8 or 9 years old, G0, P0, no history of hormone replacement therapy.    PAST SURGICAL HISTORY:  Removal of polyps in the uterus in 2013.    SOCIAL HISTORY:  Social History     Socioeconomic History    Marital status:      Spouse name: Not on file    Number of children: Not on file    Years of education: Not on file    Highest education level: Not on file   Occupational History    Not on file   Tobacco Use    Smoking status: Never    Smokeless  tobacco: Never   Substance and Sexual Activity    Alcohol use: Yes     Comment: 1-2 times per week, 2 drinks per time    Drug use: No    Sexual activity: Yes     Partners: Male   Other Topics Concern    Parent/sibling w/ CABG, MI or angioplasty before 65F 55M? Not Asked   Social History Narrative    Not on file     Social Drivers of Health     Financial Resource Strain: Low Risk  (11/11/2023)    Received from [facility], [facility]    Financial Resource Strain     Difficulty of Paying Living Expenses: 3     Difficulty of Paying Living Expenses: Not on file   Food Insecurity: No Food Insecurity (11/11/2023)    [facility]    Food Insecurity     Do you worry your food will run out before you are able to buy more?: 1   Transportation Needs: No Transportation Needs (11/11/2023)    Received from [facility]    Transportation Needs     Does lack of transportation keep you from medical appointments?: 1     Does lack of transportation keep you from work, meetings or getting things that you need?: 1   Physical Activity: Not on file   Stress: Not on file   Social Connections: Socially Integrated (11/11/2023)    [facility]    Social Connections     Do you often feel lonely or isolated from those around you?: 0   Interpersonal Safety: Not on file   Housing Stability: Low Risk  (11/11/2023)    Received from [facility]    Housing Stability     What is your housing situation today?: 1    and retired.  Previously smoked casually in her teens to 20s but no longer smokes.  Has about 6-8 servings of wine or beer per week.      FAMILY HISTORY:  Family History   Problem Relation Age of Onset    Breast Cancer Mother     Hypertension Father     Breast Cancer Maternal Aunt     Breast Cancer Maternal Aunt   "   Breast Cancer Maternal Grandmother     Diabetes Maternal Grandfather     Other Cancer Paternal Grandmother         gall bladder cancer    Hypertension Paternal Grandfather    Mother had breast cancer diagnosed age 60 that was ER negative, still living.  Maternal grandmother diagnosed with breast cancer age 60s.  Paternal grandmother diagnosed with bile duct cancer age 80s.  She has 1 brother and 1 sister with no medical issues.  She has 2 stepsons with no medical problems.  Maternal aunt was diagnosed with breast cancer age 40s and a second breast cancer age 60s, and another maternal aunt diagnosed with breast cancer age 60s with inflammatory type and passed away from her disease in late 2016.  Father  from multiple myeloma in .      PHYSICAL EXAM:  Vital signs:  /78   Pulse 72   Resp 16   Ht 1.702 m (5' 7\")   Wt 79.4 kg (175 lb)   SpO2 99%   BMI 27.41 kg/m     GENERAL/CONSTITUTIONAL: No acute distress.  EYES:  No scleral icterus.  LYMPH: No cervical, supraclavicular, axillary adenopathy.   BREAST: No palpable masses in either breast. Nipples are everted bilaterally with no discharge. No erythema, ulceration, or dimpling of the skin.  PULMONARY: No audible cough or wheezing.  MUSCULOSKELETAL: Warm and well-perfused, no cyanosis, clubbing, or edema.  INTEGUMENTARY: No rashes or jaundice.  GAIT: Steady, does not use assistive device    LABS:  None.    PATHOLOGY:  None.    IMAGIN2024 Mammogram showed no malignant findings.    2024: Breast MRI showed no malignant findings.    ASSESSMENT/PLAN:  Gayla Evans is a 54 year old female with the following issues:  1.  High risk for breast cancer due to strong family history of breast cancer  2.  Dense breasts, limiting sensitivity of mammogram detection  -I discussed with Gayla that she has no clinical evidence for breast abnormalities by physical exam from today or mammogram reviewed from 2024.  -Given her breast density and strong " family history of breast cancer, I recommended continuing alternating mammograms with breast MRI. She is due for breast MRI, which will be arranged.    3. Vaginal dryness  -Continue use of vaginal estrogen tablet maintenance 1-2 times per week as the systemic absorption is very low/minimal. She is using a convenient applicator called [employer].    4.  History of melanoma of left calf and basal cell skin carcinoma  -Continue routine skin checks with dermatology.    5. Insomnia  --Gayla does not have difficulty with falling asleep but does have difficulty with staying asleep.   --Can try non-hormone based methods, such as chamomile tea, tryptophan supplement, magnesium (at a higher dose).    Return in 1 year.    Jennifer Torrez MD  [facility] Hematology/Oncology     Total time spent today: 30 minutes in chart review, patient evaluation, counseling, documentation, test and/or medication/prescription orders, and coordination of care.     The longitudinal plan of care for the diagnosis(es)/condition(s) as documented were addressed during this visit. Due to the added complexity in care, I will continue to support Gayla in the subsequent management and with ongoing continuity of care.

## 2025-04-09 ENCOUNTER — ONCOLOGY VISIT (OUTPATIENT)
Dept: ONCOLOGY | Facility: CLINIC | Age: 55
End: 2025-04-09
Attending: INTERNAL MEDICINE
Payer: COMMERCIAL

## 2025-04-09 VITALS
RESPIRATION RATE: 16 BRPM | OXYGEN SATURATION: 99 % | DIASTOLIC BLOOD PRESSURE: 78 MMHG | WEIGHT: 175 LBS | BODY MASS INDEX: 27.47 KG/M2 | HEART RATE: 72 BPM | HEIGHT: 67 IN | SYSTOLIC BLOOD PRESSURE: 126 MMHG

## 2025-04-09 DIAGNOSIS — Z80.3 FAMILY HISTORY OF MALIGNANT NEOPLASM OF BREAST: ICD-10-CM

## 2025-04-09 DIAGNOSIS — R92.30 DENSE BREAST TISSUE: ICD-10-CM

## 2025-04-09 DIAGNOSIS — Z91.89 AT HIGH RISK FOR BREAST CANCER: Primary | ICD-10-CM

## 2025-04-09 DIAGNOSIS — Z12.31 ENCOUNTER FOR SCREENING MAMMOGRAM FOR BREAST CANCER: ICD-10-CM

## 2025-04-09 PROCEDURE — 99214 OFFICE O/P EST MOD 30 MIN: CPT | Performed by: INTERNAL MEDICINE

## 2025-04-09 PROCEDURE — G2211 COMPLEX E/M VISIT ADD ON: HCPCS | Performed by: INTERNAL MEDICINE

## 2025-04-09 PROCEDURE — 99213 OFFICE O/P EST LOW 20 MIN: CPT | Performed by: INTERNAL MEDICINE

## 2025-04-09 ASSESSMENT — PAIN SCALES - GENERAL: PAINLEVEL_OUTOF10: NO PAIN (0)

## 2025-04-09 NOTE — PATIENT INSTRUCTIONS
1. Arrange for breast MRI -- next available.  2. Arrange mammogram for 11/2025.  3. RTC MD in 1 year.

## 2025-04-09 NOTE — PROGRESS NOTES
"Oncology Rooming Note    April 9, 2025 10:14 AM   Gayla Evans is a 54 year old female who presents for:    Chief Complaint   Patient presents with    Oncology Clinic Visit     Initial Vitals: /78   Pulse 72   Resp 16   Ht 1.702 m (5' 7\")   Wt 79.4 kg (175 lb)   SpO2 99%   BMI 27.41 kg/m   Estimated body mass index is 27.41 kg/m  as calculated from the following:    Height as of this encounter: 1.702 m (5' 7\").    Weight as of this encounter: 79.4 kg (175 lb). Body surface area is 1.94 meters squared.  No Pain (0) Comment: Data Unavailable   No LMP recorded.  Allergies reviewed: Yes  Medications reviewed: Yes    Medications: Medication refills not needed today.  Pharmacy name entered into [employer]: [facility] #[id number] - [place], [place] - [address] AT [address]    Frailty Screening:   Is the patient here for a new oncology consult visit in cancer care? 2. No    PHQ9:  Did this patient require a PHQ9?: Yes   If the patient required a PHQ9 assessment, did the results require a follow up with the Provider/Nurse?: No        Rita Oh MA            "

## 2025-04-09 NOTE — LETTER
4/9/2025      Gayla Evans  [address] [place] [address]      Dear Colleague,    Thank you for referring your patient, Gayla Evans, to the [facility] [place]. Please see a copy of my visit note below.    [facility]    Hematology/Oncology Established Patient Follow-up Note    Today's Date: 4/9/2025    Reason for Follow-up: High risk surveillance for breast cancer.    HISTORY OF PRESENT ILLNESS: Gayla Evans is a 54 year old female who presents with a strong family history of breast cancer but no established genetic mutation.  Mother was diagnosed with an ER negative/TN negative for centimeter breast cancer at the age of 60 and maternal grandmother had breast cancer age 60s, maternal aunt diagnosed with breast cancer age 40s.  Her mother tested negative for BRCA 1/2 gene mutations.  Her maternal aunt never had any BRCA gene mutation testing but has been contemplating it. Gayla has no children.  Her maternal aunt also has no children.    INTERVAL HISTORY:  Gayla reports feeling well with no new breast issues.  She spent 3 months in [place] and is planning for a trip to [facility] this winter.    REVIEW OF SYSTEMS:   14 point ROS was reviewed and is negative other than as noted above in HPI.       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    Gynecologic history: Age of menarche around 8 or 9 years old, G0, P0, no history of hormone replacement therapy.    PAST SURGICAL HISTORY:  Removal of polyps in the uterus in 2013.    SOCIAL HISTORY:  Social History     Socioeconomic History     Marital  status:      Spouse name: Not on file     Number of children: Not on file     Years of education: Not on file     Highest education level: Not on file   Occupational History     Not on file   Tobacco Use     Smoking status: Never     Smokeless tobacco: Never   Substance and Sexual Activity     Alcohol use: Yes     Comment: 1-2 times per week, 2 drinks per time     Drug use: No     Sexual activity: Yes     Partners: Male   Other Topics Concern     Parent/sibling w/ CABG, MI or angioplasty before 65F 55M? Not Asked   Social History Narrative     Not on file     Social Drivers of Health     Financial Resource Strain: Low Risk  (11/11/2023)    Received [facility]    Financial Resource Strain      Difficulty of Paying Living Expenses: 3      Difficulty of Paying Living Expenses: Not on file   Food Insecurity: No Food Insecurity (11/11/2023)    Received from [facility]    Food Insecurity      Do you worry your food will run out before you are able to buy more?: 1   Transportation Needs: No Transportation Needs (11/11/2023)    Received from [facility]    Transportation Needs      Does lack of transportation keep you from medical appointments?: 1      Does lack of transportation keep you from work, meetings or getting things that you need?: 1   Physical Activity: Not on file   Stress: Not on file   Social Connections: Socially Integrated (11/11/2023)    Received from [facility]    Social Connections      Do you often feel lonely or isolated from those around you?: 0   Interpersonal Safety: Not on file   Housing Stability: Low Risk  (11/11/2023)    Received from [facility]    Housing Stability      What is your housing situation today?: 1    and retired.  Previously smoked casually in her teens  "to 20s but no longer smokes.  Has about 6-8 servings of wine or beer per week.      FAMILY HISTORY:  Family History   Problem Relation Age of Onset     Breast Cancer Mother      Hypertension Father      Breast Cancer Maternal Aunt      Breast Cancer Maternal Aunt      Breast Cancer Maternal Grandmother      Diabetes Maternal Grandfather      Other Cancer Paternal Grandmother         gall bladder cancer     Hypertension Paternal Grandfather    Mother had breast cancer diagnosed age 60 that was ER negative, still living.  Maternal grandmother diagnosed with breast cancer age 60s.  Paternal grandmother diagnosed with bile duct cancer age 80s.  She has 1 brother and 1 sister with no medical issues.  She has 2 stepsons with no medical problems.  Maternal aunt was diagnosed with breast cancer age 40s and a second breast cancer age 60s, and another maternal aunt diagnosed with breast cancer age 60s with inflammatory type and passed away from her disease in late .  Father  from multiple myeloma in .      PHYSICAL EXAM:  Vital signs:  /78   Pulse 72   Resp 16   Ht 1.702 m (5' 7\")   Wt 79.4 kg (175 lb)   SpO2 99%   BMI 27.41 kg/m     GENERAL/CONSTITUTIONAL: No acute distress.  EYES:  No scleral icterus.  LYMPH: No cervical, supraclavicular, axillary adenopathy.   BREAST: No palpable masses in either breast. Nipples are everted bilaterally with no discharge. No erythema, ulceration, or dimpling of the skin.  PULMONARY: No audible cough or wheezing.  MUSCULOSKELETAL: Warm and well-perfused, no cyanosis, clubbing, or edema.  INTEGUMENTARY: No rashes or jaundice.  GAIT: Steady, does not use assistive device    LABS:  None.    PATHOLOGY:  None.    IMAGIN2024 Mammogram showed no malignant findings.    2024: Breast MRI showed no malignant findings.    ASSESSMENT/PLAN:  Gayla Evans is a 54 year old female with the following issues:  1.  High risk for breast cancer due to strong family history " "of breast cancer  2.  Dense breasts, limiting sensitivity of mammogram detection  -I discussed with Gayla that she has no clinical evidence for breast abnormalities by physical exam from today or mammogram reviewed from 11/21/2024.  -Given her breast density and strong family history of breast cancer, I recommended continuing alternating mammograms with breast MRI. She is due for breast MRI, which will be arranged.    3. Vaginal dryness  -Continue use of vaginal estrogen tablet maintenance 1-2 times per week as the systemic absorption is very low/minimal. She is using a convenient applicator called [employer].    4.  History of melanoma of left calf and basal cell skin carcinoma  -Continue routine skin checks with dermatology.    5. Insomnia  --Gayla does not have difficulty with falling asleep but does have difficulty with staying asleep.   --Can try non-hormone based methods, such as chamomile tea, tryptophan supplement, magnesium (at a higher dose).    Return in 1 year.    Jennifer Torrez MD  [facility] Hematology/Oncology     Total time spent today: 30 minutes in chart review, patient evaluation, counseling, documentation, test and/or medication/prescription orders, and coordination of care.     The longitudinal plan of care for the diagnosis(es)/condition(s) as documented were addressed during this visit. Due to the added complexity in care, I will continue to support Gayla in the subsequent management and with ongoing continuity of care.    Oncology Rooming Note    April 9, 2025 10:14 AM   Gayla Evans is a 54 year old female who presents for:    Chief Complaint   Patient presents with     Oncology Clinic Visit     Initial Vitals: /78   Pulse 72   Resp 16   Ht 1.702 m (5' 7\")   Wt 79.4 kg (175 lb)   SpO2 99%   BMI 27.41 kg/m   Estimated body mass index is 27.41 kg/m  as calculated from the following:    Height as of this encounter: 1.702 m (5' 7\").    Weight as of this encounter: 79.4 kg " (175 lb). Body surface area is 1.94 meters squared.  No Pain (0) Comment: Data Unavailable   No LMP recorded.  Allergies reviewed: Yes  Medications reviewed: Yes    Medications: Medication refills not needed today.  Pharmacy name entered into [employer]: [id number] - [place], [place] - [address]    Frailty Screening:   Is the patient here for a new oncology consult visit in cancer care? 2. No    PHQ9:  Did this patient require a PHQ9?: Yes   If the patient required a PHQ9 assessment, did the results require a follow up with the Provider/Nurse?: No        Rita Oh MA              Again, thank you for allowing me to participate in the care of your patient.        Sincerely,        Jennifer Torrez MD    Electronically signed

## 2025-05-13 ENCOUNTER — HOSPITAL ENCOUNTER (OUTPATIENT)
Dept: MRI IMAGING | Facility: CLINIC | Age: 55
Discharge: HOME | End: 2025-05-13
Attending: INTERNAL MEDICINE
Payer: COMMERCIAL

## 2025-05-13 ENCOUNTER — RESULTS FOLLOW-UP (OUTPATIENT)
Dept: ONCOLOGY | Facility: CLINIC | Age: 55
End: 2025-05-13

## 2025-05-13 DIAGNOSIS — Z80.3 FAMILY HISTORY OF MALIGNANT NEOPLASM OF BREAST: ICD-10-CM

## 2025-05-13 DIAGNOSIS — R92.30 DENSE BREAST TISSUE: ICD-10-CM

## 2025-05-13 DIAGNOSIS — Z91.89 AT HIGH RISK FOR BREAST CANCER: ICD-10-CM

## 2025-05-13 PROCEDURE — 77049 MRI BREAST C-+ W/CAD BI: CPT

## 2025-05-13 PROCEDURE — A9585 GADOBUTROL INJECTION: HCPCS | Performed by: INTERNAL MEDICINE

## 2025-05-13 PROCEDURE — 255N000002 HC RX 255 OP 636: Performed by: INTERNAL MEDICINE

## 2025-05-13 RX ORDER — GADOBUTROL 604.72 MG/ML
8 INJECTION INTRAVENOUS ONCE
Status: COMPLETED | OUTPATIENT
Start: 2025-05-13 | End: 2025-05-13

## 2025-05-13 RX ADMIN — GADOBUTROL 8 ML: 604.72 INJECTION INTRAVENOUS at 11:22
